# Patient Record
Sex: FEMALE | Race: WHITE | NOT HISPANIC OR LATINO | Employment: OTHER | URBAN - METROPOLITAN AREA
[De-identification: names, ages, dates, MRNs, and addresses within clinical notes are randomized per-mention and may not be internally consistent; named-entity substitution may affect disease eponyms.]

---

## 2022-10-19 ENCOUNTER — OFFICE VISIT (OUTPATIENT)
Dept: ORTHOPEDIC SURGERY | Facility: CLINIC | Age: 62
End: 2022-10-19

## 2022-10-19 ENCOUNTER — PREP FOR SURGERY (OUTPATIENT)
Dept: OTHER | Facility: HOSPITAL | Age: 62
End: 2022-10-19

## 2022-10-19 VITALS — TEMPERATURE: 97.4 F | WEIGHT: 125 LBS | BODY MASS INDEX: 25.2 KG/M2 | HEIGHT: 59 IN

## 2022-10-19 DIAGNOSIS — M17.12 ARTHRITIS OF LEFT KNEE: Primary | ICD-10-CM

## 2022-10-19 DIAGNOSIS — M17.12 ARTHRITIS OF LEFT KNEE: ICD-10-CM

## 2022-10-19 DIAGNOSIS — R52 PAIN: Primary | ICD-10-CM

## 2022-10-19 DIAGNOSIS — G89.29 OTHER CHRONIC PAIN: ICD-10-CM

## 2022-10-19 PROCEDURE — 99204 OFFICE O/P NEW MOD 45 MIN: CPT | Performed by: ORTHOPAEDIC SURGERY

## 2022-10-19 PROCEDURE — 73562 X-RAY EXAM OF KNEE 3: CPT | Performed by: ORTHOPAEDIC SURGERY

## 2022-10-19 RX ORDER — GABAPENTIN 400 MG/1
800 CAPSULE ORAL 4 TIMES DAILY
COMMUNITY

## 2022-10-19 RX ORDER — LIDOCAINE 50 MG/G
1 PATCH TOPICAL EVERY 24 HOURS
COMMUNITY

## 2022-10-19 RX ORDER — FOLIC ACID 1 MG/1
2 TABLET ORAL EVERY MORNING
COMMUNITY

## 2022-10-19 RX ORDER — PREGABALIN 75 MG/1
150 CAPSULE ORAL ONCE
Status: CANCELLED | OUTPATIENT
Start: 2023-01-10 | End: 2022-10-19

## 2022-10-19 RX ORDER — FEXOFENADINE HCL 180 MG/1
180 TABLET ORAL DAILY
COMMUNITY

## 2022-10-19 RX ORDER — ACETAMINOPHEN 500 MG
1000 TABLET ORAL ONCE
Status: CANCELLED | OUTPATIENT
Start: 2023-01-10 | End: 2022-10-19

## 2022-10-19 RX ORDER — METOPROLOL TARTRATE 50 MG/1
75 TABLET, FILM COATED ORAL 2 TIMES DAILY
COMMUNITY

## 2022-10-19 RX ORDER — ACYCLOVIR 200 MG/1
400 CAPSULE ORAL DAILY
COMMUNITY

## 2022-10-19 RX ORDER — MONTELUKAST SODIUM 10 MG/1
10 TABLET ORAL DAILY
COMMUNITY

## 2022-10-19 RX ORDER — DARIFENACIN HYDROBROMIDE 15 MG/1
15 TABLET, EXTENDED RELEASE ORAL DAILY
COMMUNITY

## 2022-10-19 RX ORDER — CHLORHEXIDINE GLUCONATE 500 MG/1
1 CLOTH TOPICAL TAKE AS DIRECTED
Status: CANCELLED | OUTPATIENT
Start: 2022-10-19

## 2022-10-19 RX ORDER — MORPHINE SULFATE 30 MG/1
30 TABLET ORAL EVERY 12 HOURS
COMMUNITY
End: 2023-01-11 | Stop reason: HOSPADM

## 2022-10-19 RX ORDER — NALOXONE HYDROCHLORIDE 4 MG/.1ML
SPRAY NASAL
COMMUNITY

## 2022-10-19 RX ORDER — PROMETHAZINE HYDROCHLORIDE 25 MG/1
25 TABLET ORAL EVERY 8 HOURS PRN
COMMUNITY
End: 2023-01-11 | Stop reason: HOSPADM

## 2022-10-19 RX ORDER — POVIDONE-IODINE 10 MG/ML
SOLUTION TOPICAL ONCE
Status: CANCELLED | OUTPATIENT
Start: 2023-01-10 | End: 2022-10-19

## 2022-10-19 RX ORDER — DOXEPIN HYDROCHLORIDE 75 MG/1
75 CAPSULE ORAL NIGHTLY
COMMUNITY

## 2022-10-19 RX ORDER — AMLODIPINE BESYLATE 10 MG/1
10 TABLET ORAL DAILY
COMMUNITY

## 2022-10-19 RX ORDER — UREA 10 %
2 LOTION (ML) TOPICAL DAILY
COMMUNITY

## 2022-10-19 RX ORDER — POLYETHYLENE GLYCOL 3350
POWDER (GRAM) MISCELLANEOUS AS NEEDED
COMMUNITY

## 2022-10-19 RX ORDER — PANTOPRAZOLE SODIUM 40 MG/1
40 GRANULE, DELAYED RELEASE ORAL 2 TIMES DAILY
COMMUNITY

## 2022-10-19 NOTE — PROGRESS NOTES
Patient Name: Es Hdez   YOB: 1960  Referring Primary Care Physician: Marianne Coronel, APRN  BMI: Body mass index is 25.25 kg/m².    Chief Complaint:    Chief Complaint   Patient presents with   • Left Knee - Follow-up        HPI:     Es Hdez is a 61 y.o. female who presents today for evaluation of   Chief Complaint   Patient presents with   • Left Knee - Follow-up   .  Patient is seen today complaining of left knee pain.  Says she has right knee pain as well.  [facility] Jonathan Sty[facility].  She says that in the last year they have tried to schedule her total knee 3 times and had to cancel because of COVID positivity in family and exposure and other events that came up.  Since he is getting ready to retire she is referred here today.  She has had injections in her knee she has had an arthroscopy on the left and 4 on the right been through physical therapy.  Had spine surgeries in her lumbar spine and more recently in her neck and is on chronic pain management with it.  She says that when she is had procedures done she is only gone up slightly on her pain meds and tried to regulate them back to her normal dosing as quickly as possible.  She denies any chronic medical problems and has had multiple orthopedic type injuries in the past per her chart.      Subjective   Medications:   Home Medications:  Current Outpatient Medications on File Prior to Visit   Medication Sig   • acyclovir (ZOVIRAX) 200 MG capsule TAKE TWO CAPSULES BY MOUTH TWICE A DAY (MAY TAKE 400 MG FIVE TIMES DAILY FOR 5 DAYS FOR COLD SORE OUTBREAK)   • amLODIPine (NORVASC) 10 MG tablet Take 1 tablet by mouth Daily.   • Calcium Polycarbophil 625 MG chewable tablet Chew.   • Cholecalciferol 50 MCG (2000 UT) capsule Take  by mouth.   • darifenacin (ENABLEX) 15 MG 24 hr tablet TAKE ONE TABLET BY MOUTH DAILY FOR BLADDER   • doxepin (SINEquan) 75 MG capsule TAKE 1 CAPSULE BY MOUTH AT BEDTIME -  THIS MEDICATION MAY MAKE YOU SLEEPY   • fexofenadine (ALLEGRA) 180 MG tablet Take 1 tablet by mouth Daily.   • folic acid (FOLVITE) 1 MG tablet Take 2 tablets by mouth Every Morning.   • gabapentin (NEURONTIN) 400 MG capsule Take 2 capsules by mouth 4 (Four) Times a Day.   • Lactobacillus tablet Take  by mouth.   • lidocaine (LIDODERM) 5 % Place 1 patch on the skin as directed by provider Daily.   • metoprolol tartrate (LOPRESSOR) 50 MG tablet Take 1.5 tablets by mouth.   • montelukast (SINGULAIR) 10 MG tablet Take 1 tablet by mouth Daily.   • Morphine (MSIR) 30 MG tablet Take 1 tablet by mouth Every 12 (Twelve) Hours.   • naloxone (NARCAN) 4 MG/0.1ML nasal spray USE 4 MG (ONE SPRAY) IN ONE NOSTRIL TIMES ONE DOSE - REPEAT WITH SECOND DEVICE INTO OTHER NOSTRIL AFTER 2-3 MINUTES IF NO OR MINIMAL RESPONSE.   • pantoprazole (PROTONIX) 40 MG pack packet Take 1 packet by mouth.   • Polyethylene Glycol 3350 powder Daily.   • promethazine (PHENERGAN) 25 MG tablet TAKE ONE-HALF TABLET BY MOUTH THREE TIMES A DAY AS NEEDED FOR NAUSEA     No current facility-administered medications on file prior to visit.     Current Medications:  Scheduled Meds:  Continuous Infusions:No current facility-administered medications for this visit.    PRN Meds:.    I have reviewed the patient's medical history in detail and updated the computerized patient record.  Review and summarization of old records includes:    Past Medical History:   Diagnosis Date   • Acromioclavicular separation 1986   • Arthritis of back 1987   • Arthritis of neck 1989   • Cervical disc disorder 1989   • CTS (carpal tunnel syndrome) ?   • Dislocated elbow 1985   • Dislocation of finger 1977   • Dislocation, shoulder 1986   • Fracture of wrist 1977   • Fracture, clavicle 1985   • Fracture, finger 1980   • Frozen shoulder 1989   • Knee sprain 1976   • Knee swelling 1987   • Low back strain 2000   • Lumbosacral disc disease 2000   • Neck strain 1989   • Periarthritis of  shoulder    • Rotator cuff syndrome    • Scoliosis    • Stress fracture    • Tear of meniscus of knee    • Tendinitis of knee ?   • Tennis elbow    • Thoracic disc disorder    • Wrist sprain 1090        Past Surgical History:   Procedure Laterality Date   • BACK SURGERY     • ELBOW PROCEDURE     • HAND SURGERY     • KNEE SURGERY     • NECK SURGERY     • SHOULDER SURGERY     • TRIGGER POINT INJECTION     • WRIST SURGERY          Social History     Occupational History   • Not on file   Tobacco Use   • Smoking status: Every Day     Packs/day: 0.50     Years: 48.00     Pack years: 24.00     Types: Cigarettes, Electronic Cigarette     Start date: 10/1/1972     Last attempt to quit: 10/14/2022     Years since quittin.0   • Smokeless tobacco: Former   Substance and Sexual Activity   • Alcohol use: Never   • Drug use: Never   • Sexual activity: Not Currently     Partners: Female     Birth control/protection: None, Hysterectomy      Social History     Social History Narrative   • Not on file        Family History   Problem Relation Age of Onset   • Rheumatologic disease Mother    • Scoliosis Mother    • Diabetes Paternal Grandmother    • Cancer Paternal Grandfather    • Broken bones Maternal Aunt    • Rheumatologic disease Maternal Aunt    • Dislocations Maternal Aunt    • Rheumatologic disease Sister    • Scoliosis Maternal Uncle    • Scoliosis Sister    • Severe sprains Brother        ROS: 14 point review of systems was performed and all other systems were reviewed and are negative except for documented findings in HPI and today's encounter.     Allergies:   Allergies   Allergen Reactions   • Iron Anaphylaxis     infusions   • Penicillins Anaphylaxis   • Sm Non-Asprin Sinus [Pseudoephedrine-Acetaminophen] Anaphylaxis   • Toradol [Ketorolac Tromethamine] Swelling     Constitutional:  Denies fever, shaking or chills   Eyes:  Denies change in visual acuity   HENT:  Denies nasal congestion or  "sore throat   Respiratory:  Denies cough or shortness of breath   Cardiovascular:  Denies chest pain or severe LE edema   GI:  Denies abdominal pain, nausea, vomiting, bloody stools or diarrhea   Musculoskeletal:  Numbness, tingling, pain, or loss of motor function only as noted above in history of present illness.  : Denies painful urination or hematuria  Integument:  Denies rash, lesion or ulceration   Neurologic:  Denies headache or focal weakness  Endocrine:  Denies lymphadenopathy  Psych:  Denies confusion or change in mental status   Hem:  Denies active bleeding    OBJECTIVE:  Physical Exam: 61 y.o. female  Wt Readings from Last 3 Encounters:   10/19/22 56.7 kg (125 lb)     Ht Readings from Last 1 Encounters:   10/19/22 149.9 cm (59\")     Body mass index is 25.25 kg/m².  Vitals:    10/19/22 1311   Temp: 97.4 °F (36.3 °C)     Vital signs reviewed.     General Appearance:    Alert, cooperative, in no acute distress                  Eyes: conjunctiva clear  ENT: external ears and nose atraumatic  CV: no peripheral edema  Resp: normal respiratory effort  Skin: no rashes or wounds; normal turgor  Psych: mood and affect appropriate  Lymph: no nodes appreciated  Neuro: gross sensation intact  Vascular:  Palpable peripheral pulse in noted extremity  Musculoskeletal Extremities: Exam very pleasant patient with swelling in her left greater than right knee.  She has some pseudolaxity and joint line tenderness with osteophytes crepitation to range of motion    Radiology:   AP lateral 40 degree PA x-ray left knee taken the office today for complaints of pain without comparison views views available show advanced end-stage arthritis with osteophytes medially in the patellofemoral joint and some subchondral sclerosis        Assessment:     ICD-10-CM ICD-9-CM   1. Pain  R52 780.96   2. Arthritis of left knee  M17.12 716.96   3. Other chronic pain  G89.29 338.29        MDM/Plan:   The diagnosis(es), natural history, " pathophysiology and treatment for diagnosis(es) were discussed. Opportunity given and questions answered.  Biomechanics of pertinent body areas discussed.  When appropriate, the use of ambulatory aids discussed.    MEDICAL RECORDS reviewed from other provider(s) for past and current medical history pertinent to this complaint.  Total Knee Replacement : Continuation of conservative management vs. TKA: I reviewed anatomy of a total knee arthroplasty in laymen's terms, as well as typical postoperative recovery and possibly 6-12 months for maximal recovery, and possible need for rehabilitation stay after hospitalization. We also discussed risks, benefits, alternatives, and limitations of procedure with risks including but not limited to neurovascular damage, bleeding, infection, malalignment, chronic pain, failure of implants, osteolysis, loosening of implants, loss of motion, weakness, stiffness, instability, DVT, pulmonary embolus, death, stroke, complex regional pain syndrome, myocardial infarction, and need for additional procedures. Concept of substitution vs. replacement discussed.  No guarantees were given regarding results of surgery.  Patient verbalized understanding, and was given the opportunity to ask and have all questions answered today.       10/19/2022    Dictated utilizing Dragon dictation

## 2022-10-20 ENCOUNTER — PATIENT ROUNDING (BHMG ONLY) (OUTPATIENT)
Dept: ORTHOPEDIC SURGERY | Facility: CLINIC | Age: 62
End: 2022-10-20

## 2022-12-27 ENCOUNTER — APPOINTMENT (OUTPATIENT)
Dept: PREADMISSION TESTING | Facility: HOSPITAL | Age: 62
End: 2022-12-27

## 2022-12-28 ENCOUNTER — PRE-ADMISSION TESTING (OUTPATIENT)
Dept: PREADMISSION TESTING | Facility: HOSPITAL | Age: 62
End: 2022-12-28

## 2022-12-28 VITALS
HEART RATE: 85 BPM | HEIGHT: 59 IN | SYSTOLIC BLOOD PRESSURE: 147 MMHG | RESPIRATION RATE: 18 BRPM | BODY MASS INDEX: 22.58 KG/M2 | OXYGEN SATURATION: 97 % | DIASTOLIC BLOOD PRESSURE: 63 MMHG | WEIGHT: 112 LBS | TEMPERATURE: 98.2 F

## 2022-12-28 DIAGNOSIS — M17.12 ARTHRITIS OF LEFT KNEE: ICD-10-CM

## 2022-12-28 LAB
ANION GAP SERPL CALCULATED.3IONS-SCNC: 9 MMOL/L (ref 5–15)
BUN SERPL-MCNC: 14 MG/DL (ref 8–23)
BUN/CREAT SERPL: 19.4 (ref 7–25)
CALCIUM SPEC-SCNC: 9.1 MG/DL (ref 8.6–10.5)
CHLORIDE SERPL-SCNC: 100 MMOL/L (ref 98–107)
CO2 SERPL-SCNC: 27 MMOL/L (ref 22–29)
CREAT SERPL-MCNC: 0.72 MG/DL (ref 0.57–1)
DEPRECATED RDW RBC AUTO: 41.2 FL (ref 37–54)
EGFRCR SERPLBLD CKD-EPI 2021: 95.9 ML/MIN/1.73
ERYTHROCYTE [DISTWIDTH] IN BLOOD BY AUTOMATED COUNT: 12.6 % (ref 12.3–15.4)
GLUCOSE SERPL-MCNC: 95 MG/DL (ref 65–99)
HBA1C MFR BLD: 5.8 % (ref 4.8–5.6)
HCT VFR BLD AUTO: 35 % (ref 34–46.6)
HGB BLD-MCNC: 12 G/DL (ref 12–15.9)
MCH RBC QN AUTO: 30.8 PG (ref 26.6–33)
MCHC RBC AUTO-ENTMCNC: 34.3 G/DL (ref 31.5–35.7)
MCV RBC AUTO: 90 FL (ref 79–97)
PLATELET # BLD AUTO: 279 10*3/MM3 (ref 140–450)
PMV BLD AUTO: 9.7 FL (ref 6–12)
POTASSIUM SERPL-SCNC: 4 MMOL/L (ref 3.5–5.2)
RBC # BLD AUTO: 3.89 10*6/MM3 (ref 3.77–5.28)
SODIUM SERPL-SCNC: 136 MMOL/L (ref 136–145)
WBC NRBC COR # BLD: 8.68 10*3/MM3 (ref 3.4–10.8)

## 2022-12-28 PROCEDURE — 93005 ELECTROCARDIOGRAM TRACING: CPT

## 2022-12-28 PROCEDURE — 85027 COMPLETE CBC AUTOMATED: CPT

## 2022-12-28 PROCEDURE — 93010 ELECTROCARDIOGRAM REPORT: CPT | Performed by: INTERNAL MEDICINE

## 2022-12-28 PROCEDURE — 83036 HEMOGLOBIN GLYCOSYLATED A1C: CPT

## 2022-12-28 PROCEDURE — 80048 BASIC METABOLIC PNL TOTAL CA: CPT

## 2022-12-28 PROCEDURE — 36415 COLL VENOUS BLD VENIPUNCTURE: CPT

## 2022-12-28 RX ORDER — CHLORHEXIDINE GLUCONATE 500 MG/1
CLOTH TOPICAL TAKE AS DIRECTED
Status: ON HOLD | COMMUNITY
End: 2023-01-10

## 2022-12-28 RX ORDER — HYDROCODONE BITARTRATE AND ACETAMINOPHEN 10; 325 MG/1; MG/1
1 TABLET ORAL EVERY 8 HOURS PRN
COMMUNITY
End: 2023-01-11 | Stop reason: HOSPADM

## 2022-12-28 NOTE — DISCHARGE INSTRUCTIONS
Take the following medications the morning of surgery: METOPROLOL, AMLODIPINE, GABAPENTIN, AND PANTOPRAZOLE    ARRIVE AT 9 AM ON 1/10/23    If you are on prescription narcotic pain medication to control your pain you may also take that medication the morning of surgery.    General Instructions:  Do not eat solid food after midnight the night before surgery.  You may drink clear liquids day of surgery but must stop at least one hour before your hospital arrival time.  It is beneficial for you to have a clear drink that contains carbohydrates the day of surgery.  We suggest a 12 to 20 ounce bottle of Gatorade or Powerade for non-diabetic patients or a 12 to 20 ounce bottle of G2 or Powerade Zero for diabetic patients. (Pediatric patients, are not advised to drink a 12 to 20 ounce carbohydrate drink)    Clear liquids are liquids you can see through.  Nothing red in color.     Plain water                               Sports drinks  Sodas                                   Gelatin (Jell-O)  Fruit juices without pulp such as white grape juice and apple juice  Popsicles that contain no fruit or yogurt  Tea or coffee (no cream or milk added)  Gatorade / Powerade  G2 / Powerade Zero    Infants may have breast milk up to four hours before surgery.  Infants drinking formula may drink formula up to six hours before surgery.   Patients who avoid smoking, chewing tobacco and alcohol for 4 weeks prior to surgery have a reduced risk of post-operative complications.  Quit smoking as many days before surgery as you can.  Do not smoke, use chewing tobacco or drink alcohol the day of surgery.   If applicable bring your C-PAP/ BI-PAP machine.  Bring any papers given to you in the doctor’s office.  Wear clean comfortable clothes.  Do not wear contact lenses, false eyelashes or make-up.  Bring a case for your glasses.   Bring crutches or walker if applicable.  Remove all piercings.  Leave jewelry and any other valuables at home.  Hair  extensions with metal clips must be removed prior to surgery.  The Pre-Admission Testing nurse will instruct you to bring medications if unable to obtain an accurate list in Pre-Admission Testing.        If you were given a blood bank ID arm band remember to bring it with you the day of surgery.    Preventing a Surgical Site Infection:  For 2 to 3 days before surgery, avoid shaving with a razor because the razor can irritate skin and make it easier to develop an infection.    Any areas of open skin can increase the risk of a post-operative wound infection by allowing bacteria to enter and travel throughout the body.  Notify your surgeon if you have any skin wounds / rashes even if it is not near the expected surgical site.  The area will need assessed to determine if surgery should be delayed until it is healed.  The night prior to surgery shower using a fresh bar of anti-bacterial soap (such as Dial) and clean washcloth.  Sleep in a clean bed with clean clothing.  Do not allow pets to sleep with you.  Shower on the morning of surgery using a fresh bar of anti-bacterial soap (such as Dial) and clean washcloth.  Dry with a clean towel and dress in clean clothing.  Ask your surgeon if you will be receiving antibiotics prior to surgery.  Make sure you, your family, and all healthcare providers clean their hands with soap and water or an alcohol based hand  before caring for you or your wound.    Day of surgery:  Your arrival time is approximately two hours before your scheduled surgery time.  Upon arrival, a Pre-op nurse and Anesthesiologist will review your health history, obtain vital signs, and answer questions you may have.  The only belongings needed at this time will be a list of your home medications and if applicable your C-PAP/BI-PAP machine.  A Pre-op nurse will start an IV and you may receive medication in preparation for surgery, including something to help you relax.     Please be aware that  surgery does come with discomfort.  We want to make every effort to control your discomfort so please discuss any uncontrolled symptoms with your nurse.   Your doctor will most likely have prescribed pain medications.      If you are going home after surgery you will receive individualized written care instructions before being discharged.  A responsible adult must drive you to and from the hospital on the day of your surgery and stay with you for 24 hours.  Discharge prescriptions can be filled by the hospital pharmacy during regular pharmacy hours.  If you are having surgery late in the day/evening your prescription may be e-prescribed to your pharmacy.  Please verify your pharmacy hours or chose a 24 hour pharmacy to avoid not having access to your prescription because your pharmacy has closed for the day.    If you are staying overnight following surgery, you will be transported to your hospital room following the recovery period.  [facility] has all private rooms.    If you have any questions please call Pre-Admission Testing at [phone].  Deductibles and co-payments are collected on the day of service. Please be prepared to pay the required co-pay, deductible or deposit on the day of service as defined by your plan.    Call your surgeon immediately if you experience any of the following symptoms:  Sore Throat  Shortness of Breath or difficulty breathing  Cough  Chills  Body soreness or muscle pain  Headache  Fever  New loss of taste or smell  Do not arrive for your surgery ill.  Your procedure will need to be rescheduled to another time.  You will need to call your physician before the day of surgery to avoid any unnecessary exposure to hospital staff as well as other patients.   CHLORHEXIDINE CLOTH INSTRUCTIONS  The morning of surgery follow these instructions using the Chlorhexidine cloths you've been given.  These steps reduce bacteria on the body.  Do not use the cloths near your eyes,  ears mouth, genitalia or on open wounds.  Throw the cloths away after use but do not try to flush them down a toilet.      Open and remove one cloth at a time from the package.    Leave the cloth unfolded and begin the bathing.  Massage the skin with the cloths using gentle pressure to remove bacteria.  Do not scrub harshly.   Follow the steps below with one 2% CHG cloth per area (6 total cloths).  One cloth for neck, shoulders and chest.  One cloth for both arms, hands, fingers and underarms (do underarms last).  One cloth for the abdomen followed by groin.  One cloth for right leg and foot including between the toes.  One cloth for left leg and foot including between the toes.  The last cloth is to be used for the back of the neck, back and buttocks.    Allow the CHG to air dry 3 minutes on the skin which will give it time to work and decrease the chance of irritation.  The skin may feel sticky until it is dry.  Do not rinse with water or any other liquid or you will lose the beneficial effects of the CHG.  If mild skin irritation occurs, do rinse the skin to remove the CHG.  Report this to the nurse at time of admission.  Do not apply lotions, creams, ointments, deodorants or perfumes after using the clothes. Dress in clean clothes before coming to the hospital.

## 2022-12-29 LAB — QT INTERVAL: 409 MS

## 2023-01-03 ENCOUNTER — TELEPHONE (OUTPATIENT)
Dept: ORTHOPEDIC SURGERY | Facility: CLINIC | Age: 63
End: 2023-01-03

## 2023-01-03 NOTE — TELEPHONE ENCOUNTER
Caller: Es Hdez    Relationship to patient: Self    Best call back number:[phone]    Chief complaint: LEFT KNEE    Type of visit: PRE OP    If rescheduling, when is the original appointment: 01/03/23    Additional notes: PT CURRENTLY EXPERIENCING COVID SYMPTOMS AFTER HAVING PHYSICAL CONTACT WITH ANOTHER INFECTED PERSON. PT WOULD LIKE TO R/S 01/03 APPT FOR HER SX 01/10/23. PLEASE ADVISE

## 2023-01-04 ENCOUNTER — TELEPHONE (OUTPATIENT)
Dept: ORTHOPEDIC SURGERY | Facility: HOSPITAL | Age: 63
End: 2023-01-04
Payer: OTHER GOVERNMENT

## 2023-01-04 NOTE — TELEPHONE ENCOUNTER
Called and spoke with Ms. Hdez to see if she would be interested in going home Tuesday after her procedure. She said she would be interested in going, but the person that is going to be staying with her has made plans for Tuesday evening and wasn't going to be available until Wednesday afternoon to stay with her. Ms. Hdez is going to be an overnight admit at this time.

## 2023-01-09 ENCOUNTER — OFFICE VISIT (OUTPATIENT)
Dept: ORTHOPEDIC SURGERY | Facility: CLINIC | Age: 63
End: 2023-01-09
Payer: OTHER GOVERNMENT

## 2023-01-09 VITALS — HEIGHT: 59 IN | TEMPERATURE: 98.6 F | BODY MASS INDEX: 22.58 KG/M2 | WEIGHT: 112 LBS

## 2023-01-09 DIAGNOSIS — M17.12 ARTHRITIS OF KNEE, LEFT: Primary | ICD-10-CM

## 2023-01-09 PROCEDURE — 99024 POSTOP FOLLOW-UP VISIT: CPT | Performed by: NURSE PRACTITIONER

## 2023-01-09 NOTE — H&P (VIEW-ONLY)
"   History & Physical       Patient: Es Hdez  YOB: 1960  Medical Record Number: [id number]  Wt Readings from Last 3 Encounters:   12/28/22 50.8 kg (112 lb)   10/19/22 56.7 kg (125 lb)     Ht Readings from Last 3 Encounters:   12/28/22 149.9 cm (59\")   10/19/22 149.9 cm (59\")     There is no height or weight on file to calculate BMI.  No height and weight on file for this encounter.    Surgeon:  Dr. Nicanor Benavides    Chief Complaints:   Chief Complaint   Patient presents with   • Left Knee - Follow-up     Surgery: Left Total Knee Arthroplasty with [place] Navigation    Subjective:    History of Present Illness: 62 y.o. female presents with   Chief Complaint   Patient presents with   • Left Knee - Follow-up   . Chronic symptoms have been progressively worsening despite more conservative treatment measures including medications OTC and prescription, cortisone injections and physical therapy.  Symptoms are associated with ability to move, exercise, and perform activities of daily living.  Symptoms are aggravated by weight bearing and ROM necessary for activities of daily living.   Symptoms improve with rest, ice and elevation only minimally.      Allergies:   Allergies   Allergen Reactions   • Iron Anaphylaxis     infusions   • Penicillins Anaphylaxis   • Sm Non-Asprin Sinus [Pseudoephedrine-Acetaminophen] Anaphylaxis   • Nsaids Other (See Comments)     HISTORY OF GI BLEED   • Toradol [Ketorolac Tromethamine] Swelling       Medications:   Home Medications:  Current Outpatient Medications on File Prior to Visit   Medication Sig   • acyclovir (ZOVIRAX) 200 MG capsule Take 400 mg by mouth Daily.   • amLODIPine (NORVASC) 10 MG tablet Take 1 tablet by mouth Daily.   • Calcium Polycarbophil 625 MG chewable tablet Chew 1 tablet Daily.   • Chlorhexidine Gluconate Cloth 2 % pads Apply  topically Take As Directed.   • Cholecalciferol 50 MCG (2000 UT) capsule Take 2,000 Units by mouth Daily.   • darifenacin " (ENABLEX) 15 MG 24 hr tablet Take 15 mg by mouth Daily.   • doxepin (SINEquan) 75 MG capsule Take 75 mg by mouth Every Night.   • fexofenadine (ALLEGRA) 180 MG tablet Take 1 tablet by mouth Daily.   • folic acid (FOLVITE) 1 MG tablet Take 2 tablets by mouth Every Morning.   • gabapentin (NEURONTIN) 400 MG capsule Take 2 capsules by mouth 4 (Four) Times a Day.   • HYDROcodone-acetaminophen (NORCO)  MG per tablet Take 1 tablet by mouth Every 8 (Eight) Hours As Needed for Moderate Pain.   • Lactobacillus tablet Take 2 tablets by mouth Daily.   • lidocaine (LIDODERM) 5 % Place 1 patch on the skin as directed by provider Daily.   • metoprolol tartrate (LOPRESSOR) 50 MG tablet Take 75 mg by mouth 2 (Two) Times a Day.   • montelukast (SINGULAIR) 10 MG tablet Take 1 tablet by mouth Daily.   • Morphine (MSIR) 30 MG tablet Take 1 tablet by mouth Every 12 (Twelve) Hours.   • naloxone (NARCAN) 4 MG/0.1ML nasal spray USE 4 MG (ONE SPRAY) IN ONE NOSTRIL TIMES ONE DOSE - REPEAT WITH SECOND DEVICE INTO OTHER NOSTRIL AFTER 2-3 MINUTES IF NO OR MINIMAL RESPONSE.   • pantoprazole (PROTONIX) 40 MG pack packet Take 40 mg by mouth 2 (Two) Times a Day.   • Polyethylene Glycol 3350 powder As Needed.   • promethazine (PHENERGAN) 25 MG tablet Take 25 mg by mouth Every 8 (Eight) Hours As Needed.     Current Facility-Administered Medications on File Prior to Visit   Medication   • [START ON 1/10/2023] ropivacaine 0.5 % 50 mL, cloNIDine 80 mcg, EPINEPHrine 1 mg/mL 0.3 mg in sodium chloride 0.9 % 50 mL solution     Current Medications:  Scheduled Meds:  Continuous Infusions:No current facility-administered medications for this visit.    PRN Meds:.    I have reviewed the patient's medical history in detail and updated the computerized patient record.  Review and summarization of old records include:    Past Medical History:   Diagnosis Date   • Acromioclavicular separation 1986   • Arthritis of back 1987   • Arthritis of neck 1989   •  Bladder spasms    • Cervical disc disorder    • CTS (carpal tunnel syndrome) ?   • Dislocated elbow    • Dislocation of finger    • Dislocation, shoulder    • Fracture of wrist    • Fracture, clavicle    • Fracture, finger    • Frozen shoulder    • GERD (gastroesophageal reflux disease)    • History of GI bleed    • Knee sprain    • Knee swelling    • Low back strain    • Lumbosacral disc disease    • Neck strain    • Partial deafness of right ear    • Periarthritis of shoulder    • Rotator cuff syndrome    • Scoliosis    • Slow gastric motility    • Stress fracture    • Tear of meniscus of knee    • Tendinitis of knee ?   • Tennis elbow    • Thoracic disc disorder    • Wrist sprain 1090        Past Surgical History:   Procedure Laterality Date   • BACK SURGERY     • ELBOW PROCEDURE     • HAND SURGERY     • HYSTERECTOMY     • KNEE SURGERY     • NECK SURGERY     • SHOULDER SURGERY     • TRIGGER POINT INJECTION     • WRIST SURGERY          Social History     Occupational History   • Not on file   Tobacco Use   • Smoking status: Every Day     Packs/day: 0.50     Years: 48.00     Pack years: 24.00     Types: Cigarettes, Electronic Cigarette     Start date: 10/1/1972     Last attempt to quit: 10/14/2022     Years since quittin.2   • Smokeless tobacco: Former   Substance and Sexual Activity   • Alcohol use: Never   • Drug use: Never   • Sexual activity: Not Currently     Partners: Female     Birth control/protection: None, Hysterectomy      Social History     Social History Narrative   • Not on file        Family History   Problem Relation Age of Onset   • Rheumatologic disease Mother    • Scoliosis Mother    • Rheumatologic disease Sister    • Scoliosis Sister    • Severe sprains Brother    • Broken bones Maternal Aunt    • Rheumatologic disease Maternal Aunt    • Dislocations Maternal Aunt    • Scoliosis Maternal Uncle    • Diabetes  "Paternal Grandmother    • Cancer Paternal Grandfather    • Malig Hyperthermia Neg Hx        ROS: 14 point review of systems was performed and was negative except for documented findings in HPI and today's encounter.       Constitutional:  Denies fever, shaking or chills   Eyes:  Denies change in visual acuity   HENT:  Denies nasal congestion or sore throat   Respiratory:  Denies cough or shortness of breath   Cardiovascular:  Denies chest pain or severe LE edema   GI:  Denies abdominal pain, nausea, vomiting, bloody stools or diarrhea   Musculoskeletal:  Denies numbness tingling or loss of motor function except as outlined above in history of present illness.  : Denies painful urination or hematuria  Integument:  Denies rash, lesion or ulceration   Neurologic:  Denies headache or focal weakness  Endocrine:  Denies lymphadenopathy  Psych:  Denies confusion or change in mental status   Hem:  Denies active bleeding    Physical Exam: 62 y.o. female  Wt Readings from Last 3 Encounters:   12/28/22 50.8 kg (112 lb)   10/19/22 56.7 kg (125 lb)     Ht Readings from Last 3 Encounters:   12/28/22 149.9 cm (59\")   10/19/22 149.9 cm (59\")     There is no height or weight on file to calculate BMI.  No height and weight on file for this encounter.  There were no vitals filed for this visit.    Vital signs reviewed.   General Appearance:    Alert, cooperative, in no acute distress                  Eyes: conjunctiva clear  ENT: external ears and nose atraumatic  CV: no peripheral edema  Resp: normal respiratory effort  Skin: no rashes or wounds; normal turgor  Psych: mood and affect appropriate  Lymph: no nodes appreciated  Neuro: gross sensation intact  Vascular:  Palpable peripheral pulse in noted extremity  Musculoskeletal Extremities: KNEE Exam: medial joint line tenderness with crepitation, synovitis, swelling, and joint effusion left knee.        Diagnostic Tests:  Lab Results   Component Value Date    WBC 8.68 12/28/2022    " HGB 12.0 2022    HCT 35.0 2022    MCV 90.0 2022     2022       Lab Results   Component Value Date    GLUCOSE 95 2022    CALCIUM 9.1 2022     2022    K 4.0 2022    CO2 27.0 2022     2022    BUN 14 2022    CREATININE 0.72 2022    BCR 19.4 2022    ANIONGAP 9.0 2022       EKG:     Progress Note    HEART RATE= 78  bpm  RR Interval= 769  ms  DC Interval= 162  ms  P Horizontal Axis= 31  deg  P Front Axis= 78  deg  QRSD Interval= 139  ms  QT Interval= 409  ms  QRS Axis= 54  deg  T Wave Axis= 67  deg  - ABNORMAL ECG -  Sinus rhythm  IVCD, consider atypical LBBB  No Prior Tracing for Comparison  Electronically Signed By: Talib YadavRITO) 29-Dec-2022 09:04:44  Date and Time of Study: 2022 12:06:48     Previous EKG 2022 [facility]  Performed by [facility]  CARDIOLOGY REPORT   FACILITY: [facility]     PATIENT NAME/: ALEX ECHEVARRIA    1960   UNIT/AGE/GENDER:      AGE: 61 YR        GENDER: F   UNIT NUMBER: [id number]   ACCOUNT NUMBER: [id number]   ACCESSION NUMBER: [id number]     DATE OF EXAM: 2022  14:26   EXAMINATION(S): ECG 12-LEAD     FINAL REPORT     Procedure: ELECTROCARDIOGRAM RESULT   Heart Rate     78   P-R Interval     150 ms   QRS Interval     138 ms   QT Interval     414 ms   QTC Interval     472 ms   P Axis     67 deg   QRS Axis     -35 deg   T Wave Axis     90 deg   DATE: 2022 14:26   Sinus rhythm   Probable left atrial enlargement   Left bundle branch block   Summary: Abnormal ECG   Compared with: 2020 9:14 AM   NO SIGNIFICANT CHANGE   Electronically signed by Evert García  2022 15:08    - Pateint has history of LBBB, [facility] in  [facility] Suk[facility]'s [facility]      I have reviewed all the lab & EKG results.     Imaging was done previously in the office, viewed images and discussed with the patient:    Indication: pain related symptoms,  Assessment:  Patient Active  Problem List   Diagnosis   • Arthritis of left knee       Plan:  Reviewed anatomy of a total joint arthroplasty in laymen's terms, as well as typical postoperative recovery and possibly 6-12 months for maximal recovery, and possible need for rehabilitation stay after hospitalization. We also discussed risks, benefits, alternatives, and limitations of procedure with risks including but not limited to neurovascular damage, bleeding, infection, malalignment, chronic pian, failure of implants, osteolysis, loosening of implants, loss of motion, weakness, stiffness, instability, DVT, pulmonary embolus, death, stroke, complex regional pain syndrome, myocardial infarction, and need for additional procedures. Concept of substitution vs. replacement discussed.  No guarantees were given regarding results of surgery.      Es Hdez was given the opportunity to ask and have all questions answered today.  The patient voiced understanding of the risks, benefits, and alternative forms of treatment that were discussed and the patient consents to proceed with surgery.       Skin breakdown? WNL  Metal allergy? No  COVID Status:Vaccinated no Booster and History of Positive COVID test - symptomatic  DVT Risk Factors: no significant increased risk factors    DVT Prophylaxis:  Eliquis 2.5mg BID x 4 weeks (ASA allergy)  Walker: needs one ordered  Consults: none  Additional orders: Will need higher pain medication due to chronic pain mangemant - pain management is aware of surgery  Pharmacy: meds to beds    - Patient had positive COVID test 12/31/2022 with fever x 3 days, quarantined for 10 days, today being day 10. She has not had any fever or symptoms in the last 7 days.  She is okay to proceed with surgery at this time as she is outside of her 10-day quarantine, and has remained symptom-free.    Discharge Plan: POD 1 to home, home health and when cleared by physical therapy as safe for discharge    To be updated    Date:  1/9/2023  KLEBER Honeycutt    Dictated Utilizing Dragon Dictation

## 2023-01-09 NOTE — PROGRESS NOTES
History & Physical       Patient: Es Hdez  YOB: 1960  Medical Record Number: [id number]  Wt Readings from Last 3 Encounters:   12/28/22 50.8 kg (112 lb)   10/19/22 56.7 kg (125 lb)     Ht Readings from Last 3 Encounters:   12/28/22 149.9 cm (59\")   10/19/22 149.9 cm (59\")     There is no height or weight on file to calculate BMI.  No height and weight on file for this encounter.    Surgeon:  Dr. Nicanor Benavides    Chief Complaints:   Chief Complaint   Patient presents with   • Left Knee - Follow-up     Surgery: Left Total Knee Arthroplasty with [place] Navigation    Subjective:    History of Present Illness: 62 y.o. female presents with   Chief Complaint   Patient presents with   • Left Knee - Follow-up   . Chronic symptoms have been progressively worsening despite more conservative treatment measures including medications OTC and prescription, cortisone injections and physical therapy.  Symptoms are associated with ability to move, exercise, and perform activities of daily living.  Symptoms are aggravated by weight bearing and ROM necessary for activities of daily living.   Symptoms improve with rest, ice and elevation only minimally.      Allergies:   Allergies   Allergen Reactions   • Iron Anaphylaxis     infusions   • Penicillins Anaphylaxis   • Sm Non-Asprin Sinus [Pseudoephedrine-Acetaminophen] Anaphylaxis   • Nsaids Other (See Comments)     HISTORY OF GI BLEED   • Toradol [Ketorolac Tromethamine] Swelling       Medications:   Home Medications:  Current Outpatient Medications on File Prior to Visit   Medication Sig   • acyclovir (ZOVIRAX) 200 MG capsule Take 400 mg by mouth Daily.   • amLODIPine (NORVASC) 10 MG tablet Take 1 tablet by mouth Daily.   • Calcium Polycarbophil 625 MG chewable tablet Chew 1 tablet Daily.   • Chlorhexidine Gluconate Cloth 2 % pads Apply  topically Take As Directed.   • Cholecalciferol 50 MCG (2000 UT) capsule Take 2,000 Units by mouth Daily.   • darifenacin  (ENABLEX) 15 MG 24 hr tablet Take 15 mg by mouth Daily.   • doxepin (SINEquan) 75 MG capsule Take 75 mg by mouth Every Night.   • fexofenadine (ALLEGRA) 180 MG tablet Take 1 tablet by mouth Daily.   • folic acid (FOLVITE) 1 MG tablet Take 2 tablets by mouth Every Morning.   • gabapentin (NEURONTIN) 400 MG capsule Take 2 capsules by mouth 4 (Four) Times a Day.   • HYDROcodone-acetaminophen (NORCO)  MG per tablet Take 1 tablet by mouth Every 8 (Eight) Hours As Needed for Moderate Pain.   • Lactobacillus tablet Take 2 tablets by mouth Daily.   • lidocaine (LIDODERM) 5 % Place 1 patch on the skin as directed by provider Daily.   • metoprolol tartrate (LOPRESSOR) 50 MG tablet Take 75 mg by mouth 2 (Two) Times a Day.   • montelukast (SINGULAIR) 10 MG tablet Take 1 tablet by mouth Daily.   • Morphine (MSIR) 30 MG tablet Take 1 tablet by mouth Every 12 (Twelve) Hours.   • naloxone (NARCAN) 4 MG/0.1ML nasal spray USE 4 MG (ONE SPRAY) IN ONE NOSTRIL TIMES ONE DOSE - REPEAT WITH SECOND DEVICE INTO OTHER NOSTRIL AFTER 2-3 MINUTES IF NO OR MINIMAL RESPONSE.   • pantoprazole (PROTONIX) 40 MG pack packet Take 40 mg by mouth 2 (Two) Times a Day.   • Polyethylene Glycol 3350 powder As Needed.   • promethazine (PHENERGAN) 25 MG tablet Take 25 mg by mouth Every 8 (Eight) Hours As Needed.     Current Facility-Administered Medications on File Prior to Visit   Medication   • [START ON 1/10/2023] ropivacaine 0.5 % 50 mL, cloNIDine 80 mcg, EPINEPHrine 1 mg/mL 0.3 mg in sodium chloride 0.9 % 50 mL solution     Current Medications:  Scheduled Meds:  Continuous Infusions:No current facility-administered medications for this visit.    PRN Meds:.    I have reviewed the patient's medical history in detail and updated the computerized patient record.  Review and summarization of old records include:    Past Medical History:   Diagnosis Date   • Acromioclavicular separation 1986   • Arthritis of back 1987   • Arthritis of neck 1989   •  Bladder spasms    • Cervical disc disorder    • CTS (carpal tunnel syndrome) ?   • Dislocated elbow    • Dislocation of finger    • Dislocation, shoulder    • Fracture of wrist    • Fracture, clavicle    • Fracture, finger    • Frozen shoulder    • GERD (gastroesophageal reflux disease)    • History of GI bleed    • Knee sprain    • Knee swelling    • Low back strain    • Lumbosacral disc disease    • Neck strain    • Partial deafness of right ear    • Periarthritis of shoulder    • Rotator cuff syndrome    • Scoliosis    • Slow gastric motility    • Stress fracture    • Tear of meniscus of knee    • Tendinitis of knee ?   • Tennis elbow    • Thoracic disc disorder    • Wrist sprain 1090        Past Surgical History:   Procedure Laterality Date   • BACK SURGERY     • ELBOW PROCEDURE     • HAND SURGERY     • HYSTERECTOMY     • KNEE SURGERY     • NECK SURGERY     • SHOULDER SURGERY     • TRIGGER POINT INJECTION     • WRIST SURGERY          Social History     Occupational History   • Not on file   Tobacco Use   • Smoking status: Every Day     Packs/day: 0.50     Years: 48.00     Pack years: 24.00     Types: Cigarettes, Electronic Cigarette     Start date: 10/1/1972     Last attempt to quit: 10/14/2022     Years since quittin.2   • Smokeless tobacco: Former   Substance and Sexual Activity   • Alcohol use: Never   • Drug use: Never   • Sexual activity: Not Currently     Partners: Female     Birth control/protection: None, Hysterectomy      Social History     Social History Narrative   • Not on file        Family History   Problem Relation Age of Onset   • Rheumatologic disease Mother    • Scoliosis Mother    • Rheumatologic disease Sister    • Scoliosis Sister    • Severe sprains Brother    • Broken bones Maternal Aunt    • Rheumatologic disease Maternal Aunt    • Dislocations Maternal Aunt    • Scoliosis Maternal Uncle    • Diabetes  Paternal Grandmother    • Cancer Paternal Grandfather    • Malig Hyperthermia Neg Hx        ROS: 14 point review of systems was performed and was negative except for documented findings in HPI and today's encounter.       Constitutional:  Denies fever, shaking or chills   Eyes:  Denies change in visual acuity   HENT:  Denies nasal congestion or sore throat   Respiratory:  Denies cough or shortness of breath   Cardiovascular:  Denies chest pain or severe LE edema   GI:  Denies abdominal pain, nausea, vomiting, bloody stools or diarrhea   Musculoskeletal:  Denies numbness tingling or loss of motor function except as outlined above in history of present illness.  : Denies painful urination or hematuria  Integument:  Denies rash, lesion or ulceration   Neurologic:  Denies headache or focal weakness  Endocrine:  Denies lymphadenopathy  Psych:  Denies confusion or change in mental status   Hem:  Denies active bleeding    Physical Exam: 62 y.o. female  Wt Readings from Last 3 Encounters:   12/28/22 50.8 kg (112 lb)   10/19/22 56.7 kg (125 lb)     Ht Readings from Last 3 Encounters:   12/28/22 149.9 cm (59\")   10/19/22 149.9 cm (59\")     There is no height or weight on file to calculate BMI.  No height and weight on file for this encounter.  There were no vitals filed for this visit.    Vital signs reviewed.   General Appearance:    Alert, cooperative, in no acute distress                  Eyes: conjunctiva clear  ENT: external ears and nose atraumatic  CV: no peripheral edema  Resp: normal respiratory effort  Skin: no rashes or wounds; normal turgor  Psych: mood and affect appropriate  Lymph: no nodes appreciated  Neuro: gross sensation intact  Vascular:  Palpable peripheral pulse in noted extremity  Musculoskeletal Extremities: KNEE Exam: medial joint line tenderness with crepitation, synovitis, swelling, and joint effusion left knee.        Diagnostic Tests:  Lab Results   Component Value Date    WBC 8.68 12/28/2022     HGB 12.0 2022    HCT 35.0 2022    MCV 90.0 2022     2022       Lab Results   Component Value Date    GLUCOSE 95 2022    CALCIUM 9.1 2022     2022    K 4.0 2022    CO2 27.0 2022     2022    BUN 14 2022    CREATININE 0.72 2022    BCR 19.4 2022    ANIONGAP 9.0 2022       EKG:     Progress Note    HEART RATE= 78  bpm  RR Interval= 769  ms  DC Interval= 162  ms  P Horizontal Axis= 31  deg  P Front Axis= 78  deg  QRSD Interval= 139  ms  QT Interval= 409  ms  QRS Axis= 54  deg  T Wave Axis= 67  deg  - ABNORMAL ECG -  Sinus rhythm  IVCD, consider atypical LBBB  No Prior Tracing for Comparison  Electronically Signed By: Talib YadavRITO) 29-Dec-2022 09:04:44  Date and Time of Study: 2022 12:06:48     Previous EKG 2022 [facility]  Performed by [facility]  CARDIOLOGY REPORT   FACILITY: [facility]     PATIENT NAME/: ALEX ECHEVARRIA    1960   UNIT/AGE/GENDER:      AGE: 61 YR        GENDER: F   UNIT NUMBER: [id number]   ACCOUNT NUMBER: [id number]   ACCESSION NUMBER: [id number]     DATE OF EXAM: 2022  14:26   EXAMINATION(S): ECG 12-LEAD     FINAL REPORT     Procedure: ELECTROCARDIOGRAM RESULT   Heart Rate     78   P-R Interval     150 ms   QRS Interval     138 ms   QT Interval     414 ms   QTC Interval     472 ms   P Axis     67 deg   QRS Axis     -35 deg   T Wave Axis     90 deg   DATE: 2022 14:26   Sinus rhythm   Probable left atrial enlargement   Left bundle branch block   Summary: Abnormal ECG   Compared with: 2020 9:14 AM   NO SIGNIFICANT CHANGE   Electronically signed by Evert García  2022 15:08    - Pateint has history of LBBB, [facility] Suk[facility]'s [facility]      I have reviewed all the lab & EKG results.     Imaging was done previously in the office, viewed images and discussed with the patient:    Indication: pain related symptoms,  Assessment:  Patient Active  Problem List   Diagnosis   • Arthritis of left knee       Plan:  Reviewed anatomy of a total joint arthroplasty in laymen's terms, as well as typical postoperative recovery and possibly 6-12 months for maximal recovery, and possible need for rehabilitation stay after hospitalization. We also discussed risks, benefits, alternatives, and limitations of procedure with risks including but not limited to neurovascular damage, bleeding, infection, malalignment, chronic pian, failure of implants, osteolysis, loosening of implants, loss of motion, weakness, stiffness, instability, DVT, pulmonary embolus, death, stroke, complex regional pain syndrome, myocardial infarction, and need for additional procedures. Concept of substitution vs. replacement discussed.  No guarantees were given regarding results of surgery.      Es Hdez was given the opportunity to ask and have all questions answered today.  The patient voiced understanding of the risks, benefits, and alternative forms of treatment that were discussed and the patient consents to proceed with surgery.       Skin breakdown? WNL  Metal allergy? No  COVID Status:Vaccinated no Booster and History of Positive COVID test - symptomatic  DVT Risk Factors: no significant increased risk factors    DVT Prophylaxis:  Eliquis 2.5mg BID x 4 weeks (ASA allergy)  Walker: needs one ordered  Consults: none  Additional orders: Will need higher pain medication due to chronic pain mangemant - pain management is aware of surgery  Pharmacy: meds to beds    - Patient had positive COVID test 12/31/2022 with fever x 3 days, quarantined for 10 days, today being day 10. She has not had any fever or symptoms in the last 7 days.  She is okay to proceed with surgery at this time as she is outside of her 10-day quarantine, and has remained symptom-free.    Discharge Plan: POD 1 to home, home health and when cleared by physical therapy as safe for discharge    To be updated    Date:  1/9/2023  KLEBER Honeycutt    Dictated Utilizing Dragon Dictation

## 2023-01-10 ENCOUNTER — ANESTHESIA EVENT (OUTPATIENT)
Dept: PERIOP | Facility: HOSPITAL | Age: 63
End: 2023-01-10
Payer: OTHER GOVERNMENT

## 2023-01-10 ENCOUNTER — HOSPITAL ENCOUNTER (OUTPATIENT)
Facility: HOSPITAL | Age: 63
Discharge: HOME | End: 2023-01-11
Attending: ORTHOPAEDIC SURGERY | Admitting: ORTHOPAEDIC SURGERY
Payer: OTHER GOVERNMENT

## 2023-01-10 ENCOUNTER — ANESTHESIA (OUTPATIENT)
Dept: PERIOP | Facility: HOSPITAL | Age: 63
End: 2023-01-10
Payer: OTHER GOVERNMENT

## 2023-01-10 ENCOUNTER — APPOINTMENT (OUTPATIENT)
Dept: GENERAL RADIOLOGY | Facility: HOSPITAL | Age: 63
End: 2023-01-10
Payer: OTHER GOVERNMENT

## 2023-01-10 DIAGNOSIS — M17.12 ARTHRITIS OF LEFT KNEE: ICD-10-CM

## 2023-01-10 DIAGNOSIS — Z96.652 S/P TKR (TOTAL KNEE REPLACEMENT), LEFT: Primary | ICD-10-CM

## 2023-01-10 PROCEDURE — G0378 HOSPITAL OBSERVATION PER HR: HCPCS

## 2023-01-10 PROCEDURE — 25010000002 HYDROMORPHONE 1 MG/ML SOLUTION: Performed by: NURSE ANESTHETIST, CERTIFIED REGISTERED

## 2023-01-10 PROCEDURE — 25010000002 ROPIVACAINE PER 1 MG: Performed by: ORTHOPAEDIC SURGERY

## 2023-01-10 PROCEDURE — 20985 CPTR-ASST DIR MS PX: CPT | Performed by: ORTHOPAEDIC SURGERY

## 2023-01-10 PROCEDURE — 25010000002 FENTANYL CITRATE (PF) 50 MCG/ML SOLUTION: Performed by: NURSE ANESTHETIST, CERTIFIED REGISTERED

## 2023-01-10 PROCEDURE — 97162 PT EVAL MOD COMPLEX 30 MIN: CPT

## 2023-01-10 PROCEDURE — 25010000002 PROPOFOL 10 MG/ML EMULSION: Performed by: NURSE ANESTHETIST, CERTIFIED REGISTERED

## 2023-01-10 PROCEDURE — 97530 THERAPEUTIC ACTIVITIES: CPT

## 2023-01-10 PROCEDURE — 25010000002 EPINEPHRINE 1 MG/ML SOLUTION 30 ML VIAL: Performed by: ORTHOPAEDIC SURGERY

## 2023-01-10 PROCEDURE — 25010000002 ONDANSETRON PER 1 MG: Performed by: NURSE ANESTHETIST, CERTIFIED REGISTERED

## 2023-01-10 PROCEDURE — 25010000002 FENTANYL CITRATE (PF) 100 MCG/2ML SOLUTION: Performed by: NURSE ANESTHETIST, CERTIFIED REGISTERED

## 2023-01-10 PROCEDURE — 25010000002 VANCOMYCIN 750 MG RECONSTITUTED SOLUTION 1 EACH VIAL: Performed by: ORTHOPAEDIC SURGERY

## 2023-01-10 PROCEDURE — C1713 ANCHOR/SCREW BN/BN,TIS/BN: HCPCS | Performed by: ORTHOPAEDIC SURGERY

## 2023-01-10 PROCEDURE — 73560 X-RAY EXAM OF KNEE 1 OR 2: CPT

## 2023-01-10 PROCEDURE — 25010000002 NEOSTIGMINE 5 MG/10ML SOLUTION: Performed by: NURSE ANESTHETIST, CERTIFIED REGISTERED

## 2023-01-10 PROCEDURE — C1776 JOINT DEVICE (IMPLANTABLE): HCPCS | Performed by: ORTHOPAEDIC SURGERY

## 2023-01-10 PROCEDURE — 25010000002 VANCOMYCIN 750 MG RECONSTITUTED SOLUTION 1 EACH VIAL: Performed by: NURSE PRACTITIONER

## 2023-01-10 PROCEDURE — 25010000002 CLONIDINE PER 1 MG: Performed by: ORTHOPAEDIC SURGERY

## 2023-01-10 PROCEDURE — 25010000002 MIDAZOLAM PER 1 MG: Performed by: ANESTHESIOLOGY

## 2023-01-10 PROCEDURE — 25010000002 DEXAMETHASONE SODIUM PHOSPHATE 20 MG/5ML SOLUTION: Performed by: NURSE ANESTHETIST, CERTIFIED REGISTERED

## 2023-01-10 PROCEDURE — 25010000002 HYDROMORPHONE PER 4 MG: Performed by: NURSE ANESTHETIST, CERTIFIED REGISTERED

## 2023-01-10 PROCEDURE — 27447 TOTAL KNEE ARTHROPLASTY: CPT | Performed by: ORTHOPAEDIC SURGERY

## 2023-01-10 DEVICE — SMARTSET HIGH PERFORMANCE MV MEDIUM VISCOSITY BONE CEMENT 40G
Type: IMPLANTABLE DEVICE | Site: KNEE | Status: FUNCTIONAL
Brand: SMARTSET

## 2023-01-10 DEVICE — DEV CONTRL TISS STRATAFIX SYMM PDS PLUS VIL CT-1 60CM: Type: IMPLANTABLE DEVICE | Site: KNEE | Status: FUNCTIONAL

## 2023-01-10 DEVICE — CAP KN ATTUNE FB CMT: Type: IMPLANTABLE DEVICE | Site: KNEE | Status: FUNCTIONAL

## 2023-01-10 DEVICE — ATTUNE PATELLA MEDIALIZED DOME 35MM CEMENTED AOX
Type: IMPLANTABLE DEVICE | Site: KNEE | Status: FUNCTIONAL
Brand: ATTUNE

## 2023-01-10 DEVICE — ATTUNE KNEE SYSTEM TIBIAL INSERT FIXED BEARING CRUCIATE RETAINING 4 7MM AOX
Type: IMPLANTABLE DEVICE | Site: KNEE | Status: FUNCTIONAL
Brand: ATTUNE

## 2023-01-10 DEVICE — ATTUNE KNEE SYSTEM FEMORAL CRUCIATE RETAINING NARROW SIZE 4N LEFT CEMENTED
Type: IMPLANTABLE DEVICE | Site: KNEE | Status: FUNCTIONAL
Brand: ATTUNE

## 2023-01-10 DEVICE — ATTUNE KNEE SYSTEM TIBIAL BASE FIXED BEARING SIZE 4 CEMENTED
Type: IMPLANTABLE DEVICE | Site: KNEE | Status: FUNCTIONAL
Brand: ATTUNE

## 2023-01-10 DEVICE — DEV CONTRL TISS STRATAFIXSPIRALMNCRYL PLSPS2 REV3/0 45CM: Type: IMPLANTABLE DEVICE | Site: KNEE | Status: FUNCTIONAL

## 2023-01-10 RX ORDER — HYDROMORPHONE HYDROCHLORIDE 1 MG/ML
0.5 INJECTION, SOLUTION INTRAMUSCULAR; INTRAVENOUS; SUBCUTANEOUS
Status: DISCONTINUED | OUTPATIENT
Start: 2023-01-10 | End: 2023-01-11 | Stop reason: HOSPADM

## 2023-01-10 RX ORDER — FENTANYL CITRATE 50 UG/ML
INJECTION, SOLUTION INTRAMUSCULAR; INTRAVENOUS AS NEEDED
Status: DISCONTINUED | OUTPATIENT
Start: 2023-01-10 | End: 2023-01-10 | Stop reason: SURG

## 2023-01-10 RX ORDER — ACYCLOVIR 200 MG/1
400 CAPSULE ORAL DAILY
Status: DISCONTINUED | OUTPATIENT
Start: 2023-01-10 | End: 2023-01-11 | Stop reason: HOSPADM

## 2023-01-10 RX ORDER — PROMETHAZINE HYDROCHLORIDE 25 MG/1
25 SUPPOSITORY RECTAL ONCE AS NEEDED
Status: DISCONTINUED | OUTPATIENT
Start: 2023-01-10 | End: 2023-01-10 | Stop reason: HOSPADM

## 2023-01-10 RX ORDER — ACETAMINOPHEN 650 MG/1
650 SUPPOSITORY RECTAL ONCE AS NEEDED
Status: DISCONTINUED | OUTPATIENT
Start: 2023-01-10 | End: 2023-01-10 | Stop reason: HOSPADM

## 2023-01-10 RX ORDER — NALOXONE HCL 0.4 MG/ML
0.1 VIAL (ML) INJECTION
Status: DISCONTINUED | OUTPATIENT
Start: 2023-01-10 | End: 2023-01-11 | Stop reason: HOSPADM

## 2023-01-10 RX ORDER — HYDROCODONE BITARTRATE AND ACETAMINOPHEN 5; 325 MG/1; MG/1
1 TABLET ORAL ONCE AS NEEDED
Status: COMPLETED | OUTPATIENT
Start: 2023-01-10 | End: 2023-01-10

## 2023-01-10 RX ORDER — PROPOFOL 10 MG/ML
VIAL (ML) INTRAVENOUS AS NEEDED
Status: DISCONTINUED | OUTPATIENT
Start: 2023-01-10 | End: 2023-01-10 | Stop reason: SURG

## 2023-01-10 RX ORDER — PANTOPRAZOLE SODIUM 40 MG/1
40 TABLET, DELAYED RELEASE ORAL
Status: DISCONTINUED | OUTPATIENT
Start: 2023-01-10 | End: 2023-01-11 | Stop reason: HOSPADM

## 2023-01-10 RX ORDER — DOXEPIN HYDROCHLORIDE 75 MG/1
75 CAPSULE ORAL NIGHTLY
Status: DISCONTINUED | OUTPATIENT
Start: 2023-01-10 | End: 2023-01-11 | Stop reason: HOSPADM

## 2023-01-10 RX ORDER — PREGABALIN 75 MG/1
150 CAPSULE ORAL ONCE
Status: COMPLETED | OUTPATIENT
Start: 2023-01-10 | End: 2023-01-10

## 2023-01-10 RX ORDER — ONDANSETRON 2 MG/ML
INJECTION INTRAMUSCULAR; INTRAVENOUS AS NEEDED
Status: DISCONTINUED | OUTPATIENT
Start: 2023-01-10 | End: 2023-01-10 | Stop reason: SURG

## 2023-01-10 RX ORDER — BISACODYL 5 MG/1
10 TABLET, DELAYED RELEASE ORAL DAILY PRN
Status: DISCONTINUED | OUTPATIENT
Start: 2023-01-10 | End: 2023-01-11 | Stop reason: HOSPADM

## 2023-01-10 RX ORDER — BISACODYL 10 MG
10 SUPPOSITORY, RECTAL RECTAL DAILY PRN
Status: DISCONTINUED | OUTPATIENT
Start: 2023-01-10 | End: 2023-01-11 | Stop reason: HOSPADM

## 2023-01-10 RX ORDER — OXYBUTYNIN CHLORIDE 10 MG/1
10 TABLET, EXTENDED RELEASE ORAL DAILY
Status: DISCONTINUED | OUTPATIENT
Start: 2023-01-10 | End: 2023-01-11 | Stop reason: HOSPADM

## 2023-01-10 RX ORDER — NALOXONE HCL 0.4 MG/ML
0.2 VIAL (ML) INJECTION AS NEEDED
Status: DISCONTINUED | OUTPATIENT
Start: 2023-01-10 | End: 2023-01-10 | Stop reason: HOSPADM

## 2023-01-10 RX ORDER — ONDANSETRON 2 MG/ML
4 INJECTION INTRAMUSCULAR; INTRAVENOUS EVERY 6 HOURS PRN
Status: DISCONTINUED | OUTPATIENT
Start: 2023-01-10 | End: 2023-01-11 | Stop reason: HOSPADM

## 2023-01-10 RX ORDER — OXYCODONE HYDROCHLORIDE AND ACETAMINOPHEN 5; 325 MG/1; MG/1
1 TABLET ORAL EVERY 4 HOURS PRN
Status: DISCONTINUED | OUTPATIENT
Start: 2023-01-10 | End: 2023-01-11 | Stop reason: HOSPADM

## 2023-01-10 RX ORDER — LABETALOL HYDROCHLORIDE 5 MG/ML
5 INJECTION, SOLUTION INTRAVENOUS
Status: DISCONTINUED | OUTPATIENT
Start: 2023-01-10 | End: 2023-01-10 | Stop reason: HOSPADM

## 2023-01-10 RX ORDER — MAGNESIUM HYDROXIDE 1200 MG/15ML
LIQUID ORAL AS NEEDED
Status: DISCONTINUED | OUTPATIENT
Start: 2023-01-10 | End: 2023-01-10 | Stop reason: HOSPADM

## 2023-01-10 RX ORDER — DOCUSATE SODIUM 100 MG/1
100 CAPSULE, LIQUID FILLED ORAL 2 TIMES DAILY
Status: DISCONTINUED | OUTPATIENT
Start: 2023-01-10 | End: 2023-01-11 | Stop reason: HOSPADM

## 2023-01-10 RX ORDER — POLYETHYLENE GLYCOL 3350 17 G/17G
17 POWDER, FOR SOLUTION ORAL DAILY
Status: DISCONTINUED | OUTPATIENT
Start: 2023-01-10 | End: 2023-01-11 | Stop reason: HOSPADM

## 2023-01-10 RX ORDER — OXYCODONE HYDROCHLORIDE AND ACETAMINOPHEN 5; 325 MG/1; MG/1
2 TABLET ORAL EVERY 4 HOURS PRN
Status: DISCONTINUED | OUTPATIENT
Start: 2023-01-10 | End: 2023-01-11 | Stop reason: HOSPADM

## 2023-01-10 RX ORDER — PROMETHAZINE HYDROCHLORIDE 25 MG/1
25 TABLET ORAL ONCE AS NEEDED
Status: DISCONTINUED | OUTPATIENT
Start: 2023-01-10 | End: 2023-01-10 | Stop reason: HOSPADM

## 2023-01-10 RX ORDER — NEOSTIGMINE METHYLSULFATE 0.5 MG/ML
INJECTION, SOLUTION INTRAVENOUS AS NEEDED
Status: DISCONTINUED | OUTPATIENT
Start: 2023-01-10 | End: 2023-01-10 | Stop reason: SURG

## 2023-01-10 RX ORDER — PHENYLEPHRINE HCL IN 0.9% NACL 1 MG/10 ML
SYRINGE (ML) INTRAVENOUS AS NEEDED
Status: DISCONTINUED | OUTPATIENT
Start: 2023-01-10 | End: 2023-01-10 | Stop reason: SURG

## 2023-01-10 RX ORDER — ACETAMINOPHEN 325 MG/1
325 TABLET ORAL EVERY 4 HOURS PRN
Status: DISCONTINUED | OUTPATIENT
Start: 2023-01-10 | End: 2023-01-11 | Stop reason: HOSPADM

## 2023-01-10 RX ORDER — ACETAMINOPHEN 325 MG/1
650 TABLET ORAL ONCE AS NEEDED
Status: DISCONTINUED | OUTPATIENT
Start: 2023-01-10 | End: 2023-01-10 | Stop reason: HOSPADM

## 2023-01-10 RX ORDER — ACETAMINOPHEN 500 MG
1000 TABLET ORAL ONCE
Status: COMPLETED | OUTPATIENT
Start: 2023-01-10 | End: 2023-01-10

## 2023-01-10 RX ORDER — ALBUTEROL SULFATE 2.5 MG/3ML
2.5 SOLUTION RESPIRATORY (INHALATION) ONCE AS NEEDED
Status: DISCONTINUED | OUTPATIENT
Start: 2023-01-10 | End: 2023-01-10 | Stop reason: HOSPADM

## 2023-01-10 RX ORDER — FOLIC ACID 1 MG/1
2000 TABLET ORAL EVERY MORNING
Status: DISCONTINUED | OUTPATIENT
Start: 2023-01-10 | End: 2023-01-11 | Stop reason: HOSPADM

## 2023-01-10 RX ORDER — GABAPENTIN 400 MG/1
800 CAPSULE ORAL 4 TIMES DAILY
Status: DISCONTINUED | OUTPATIENT
Start: 2023-01-10 | End: 2023-01-11 | Stop reason: HOSPADM

## 2023-01-10 RX ORDER — TRANEXAMIC ACID 100 MG/ML
INJECTION, SOLUTION INTRAVENOUS AS NEEDED
Status: DISCONTINUED | OUTPATIENT
Start: 2023-01-10 | End: 2023-01-10 | Stop reason: SURG

## 2023-01-10 RX ORDER — HYDRALAZINE HYDROCHLORIDE 20 MG/ML
5 INJECTION INTRAMUSCULAR; INTRAVENOUS
Status: DISCONTINUED | OUTPATIENT
Start: 2023-01-10 | End: 2023-01-10 | Stop reason: HOSPADM

## 2023-01-10 RX ORDER — DEXAMETHASONE SODIUM PHOSPHATE 4 MG/ML
INJECTION, SOLUTION INTRA-ARTICULAR; INTRALESIONAL; INTRAMUSCULAR; INTRAVENOUS; SOFT TISSUE AS NEEDED
Status: DISCONTINUED | OUTPATIENT
Start: 2023-01-10 | End: 2023-01-10 | Stop reason: SURG

## 2023-01-10 RX ORDER — FENTANYL CITRATE 50 UG/ML
50 INJECTION, SOLUTION INTRAMUSCULAR; INTRAVENOUS
Status: DISCONTINUED | OUTPATIENT
Start: 2023-01-10 | End: 2023-01-10 | Stop reason: HOSPADM

## 2023-01-10 RX ORDER — SODIUM CHLORIDE, SODIUM LACTATE, POTASSIUM CHLORIDE, CALCIUM CHLORIDE 600; 310; 30; 20 MG/100ML; MG/100ML; MG/100ML; MG/100ML
100 INJECTION, SOLUTION INTRAVENOUS CONTINUOUS
Status: DISCONTINUED | OUTPATIENT
Start: 2023-01-10 | End: 2023-01-11 | Stop reason: HOSPADM

## 2023-01-10 RX ORDER — EPHEDRINE SULFATE 50 MG/ML
INJECTION INTRAVENOUS AS NEEDED
Status: DISCONTINUED | OUTPATIENT
Start: 2023-01-10 | End: 2023-01-10 | Stop reason: SURG

## 2023-01-10 RX ORDER — FLUMAZENIL 0.1 MG/ML
0.2 INJECTION INTRAVENOUS AS NEEDED
Status: DISCONTINUED | OUTPATIENT
Start: 2023-01-10 | End: 2023-01-10 | Stop reason: HOSPADM

## 2023-01-10 RX ORDER — ONDANSETRON 4 MG/1
4 TABLET, FILM COATED ORAL EVERY 6 HOURS PRN
Status: DISCONTINUED | OUTPATIENT
Start: 2023-01-10 | End: 2023-01-11 | Stop reason: HOSPADM

## 2023-01-10 RX ORDER — SODIUM CHLORIDE 0.9 % (FLUSH) 0.9 %
3-10 SYRINGE (ML) INJECTION AS NEEDED
Status: DISCONTINUED | OUTPATIENT
Start: 2023-01-10 | End: 2023-01-10 | Stop reason: HOSPADM

## 2023-01-10 RX ORDER — SODIUM CHLORIDE, SODIUM LACTATE, POTASSIUM CHLORIDE, CALCIUM CHLORIDE 600; 310; 30; 20 MG/100ML; MG/100ML; MG/100ML; MG/100ML
9 INJECTION, SOLUTION INTRAVENOUS CONTINUOUS
Status: DISCONTINUED | OUTPATIENT
Start: 2023-01-10 | End: 2023-01-11 | Stop reason: HOSPADM

## 2023-01-10 RX ORDER — LIDOCAINE HYDROCHLORIDE 20 MG/ML
INJECTION, SOLUTION INFILTRATION; PERINEURAL AS NEEDED
Status: DISCONTINUED | OUTPATIENT
Start: 2023-01-10 | End: 2023-01-10 | Stop reason: SURG

## 2023-01-10 RX ORDER — HYDROMORPHONE HYDROCHLORIDE 1 MG/ML
0.25 INJECTION, SOLUTION INTRAMUSCULAR; INTRAVENOUS; SUBCUTANEOUS
Status: DISCONTINUED | OUTPATIENT
Start: 2023-01-10 | End: 2023-01-10 | Stop reason: HOSPADM

## 2023-01-10 RX ORDER — GLYCOPYRROLATE 0.2 MG/ML
INJECTION INTRAMUSCULAR; INTRAVENOUS AS NEEDED
Status: DISCONTINUED | OUTPATIENT
Start: 2023-01-10 | End: 2023-01-10 | Stop reason: SURG

## 2023-01-10 RX ORDER — LIDOCAINE HYDROCHLORIDE 10 MG/ML
0.5 INJECTION, SOLUTION EPIDURAL; INFILTRATION; INTRACAUDAL; PERINEURAL ONCE AS NEEDED
Status: COMPLETED | OUTPATIENT
Start: 2023-01-10 | End: 2023-01-10

## 2023-01-10 RX ORDER — MONTELUKAST SODIUM 10 MG/1
10 TABLET ORAL NIGHTLY
Status: DISCONTINUED | OUTPATIENT
Start: 2023-01-10 | End: 2023-01-11 | Stop reason: HOSPADM

## 2023-01-10 RX ORDER — ROCURONIUM BROMIDE 10 MG/ML
INJECTION, SOLUTION INTRAVENOUS AS NEEDED
Status: DISCONTINUED | OUTPATIENT
Start: 2023-01-10 | End: 2023-01-10 | Stop reason: SURG

## 2023-01-10 RX ORDER — AMLODIPINE BESYLATE 10 MG/1
10 TABLET ORAL DAILY
Status: DISCONTINUED | OUTPATIENT
Start: 2023-01-10 | End: 2023-01-11 | Stop reason: HOSPADM

## 2023-01-10 RX ORDER — KETAMINE HCL IN NACL, ISO-OSM 100MG/10ML
SYRINGE (ML) INJECTION AS NEEDED
Status: DISCONTINUED | OUTPATIENT
Start: 2023-01-10 | End: 2023-01-10 | Stop reason: SURG

## 2023-01-10 RX ORDER — ONDANSETRON 2 MG/ML
4 INJECTION INTRAMUSCULAR; INTRAVENOUS ONCE AS NEEDED
Status: DISCONTINUED | OUTPATIENT
Start: 2023-01-10 | End: 2023-01-10 | Stop reason: HOSPADM

## 2023-01-10 RX ORDER — MIDAZOLAM HYDROCHLORIDE 1 MG/ML
1 INJECTION INTRAMUSCULAR; INTRAVENOUS
Status: DISCONTINUED | OUTPATIENT
Start: 2023-01-10 | End: 2023-01-10 | Stop reason: HOSPADM

## 2023-01-10 RX ORDER — SODIUM CHLORIDE 0.9 % (FLUSH) 0.9 %
3 SYRINGE (ML) INJECTION EVERY 12 HOURS SCHEDULED
Status: DISCONTINUED | OUTPATIENT
Start: 2023-01-10 | End: 2023-01-10 | Stop reason: HOSPADM

## 2023-01-10 RX ORDER — FAMOTIDINE 10 MG/ML
20 INJECTION, SOLUTION INTRAVENOUS ONCE
Status: COMPLETED | OUTPATIENT
Start: 2023-01-10 | End: 2023-01-10

## 2023-01-10 RX ADMIN — Medication 10 MG: at 09:30

## 2023-01-10 RX ADMIN — Medication 10 MG: at 09:20

## 2023-01-10 RX ADMIN — VANCOMYCIN HYDROCHLORIDE 750 MG: 750 INJECTION, POWDER, LYOPHILIZED, FOR SOLUTION INTRAVENOUS at 08:42

## 2023-01-10 RX ADMIN — VANCOMYCIN HYDROCHLORIDE 750 MG: 750 INJECTION, POWDER, LYOPHILIZED, FOR SOLUTION INTRAVENOUS at 20:43

## 2023-01-10 RX ADMIN — ROCURONIUM BROMIDE 25 MG: 50 INJECTION INTRAVENOUS at 09:20

## 2023-01-10 RX ADMIN — SODIUM CHLORIDE, POTASSIUM CHLORIDE, SODIUM LACTATE AND CALCIUM CHLORIDE 100 ML/HR: 600; 310; 30; 20 INJECTION, SOLUTION INTRAVENOUS at 15:14

## 2023-01-10 RX ADMIN — SODIUM CHLORIDE, POTASSIUM CHLORIDE, SODIUM LACTATE AND CALCIUM CHLORIDE: 600; 310; 30; 20 INJECTION, SOLUTION INTRAVENOUS at 10:15

## 2023-01-10 RX ADMIN — ACYCLOVIR 400 MG: 200 CAPSULE ORAL at 16:19

## 2023-01-10 RX ADMIN — EPHEDRINE SULFATE 10 MG: 50 INJECTION INTRAVENOUS at 09:34

## 2023-01-10 RX ADMIN — OXYCODONE AND ACETAMINOPHEN 1 TABLET: 5; 325 TABLET ORAL at 22:15

## 2023-01-10 RX ADMIN — TRANEXAMIC ACID 1000 MG: 100 INJECTION, SOLUTION INTRAVENOUS at 09:43

## 2023-01-10 RX ADMIN — DEXAMETHASONE SODIUM PHOSPHATE 8 MG: 4 INJECTION, SOLUTION INTRAMUSCULAR; INTRAVENOUS at 09:35

## 2023-01-10 RX ADMIN — OXYBUTYNIN CHLORIDE 10 MG: 10 TABLET, EXTENDED RELEASE ORAL at 16:18

## 2023-01-10 RX ADMIN — MONTELUKAST SODIUM 10 MG: 10 TABLET, FILM COATED ORAL at 22:15

## 2023-01-10 RX ADMIN — HYDROCODONE BITARTRATE AND ACETAMINOPHEN 1 TABLET: 5; 325 TABLET ORAL at 12:00

## 2023-01-10 RX ADMIN — METOPROLOL TARTRATE 75 MG: 25 TABLET, FILM COATED ORAL at 22:14

## 2023-01-10 RX ADMIN — HYDROMORPHONE HYDROCHLORIDE 0.25 MG: 1 INJECTION, SOLUTION INTRAMUSCULAR; INTRAVENOUS; SUBCUTANEOUS at 12:25

## 2023-01-10 RX ADMIN — SODIUM CHLORIDE, POTASSIUM CHLORIDE, SODIUM LACTATE AND CALCIUM CHLORIDE 9 ML/HR: 600; 310; 30; 20 INJECTION, SOLUTION INTRAVENOUS at 07:41

## 2023-01-10 RX ADMIN — NEOSTIGMINE METHYLSULFATE 3.5 MG: 0.5 INJECTION INTRAVENOUS at 11:01

## 2023-01-10 RX ADMIN — ONDANSETRON 4 MG: 2 INJECTION INTRAMUSCULAR; INTRAVENOUS at 10:55

## 2023-01-10 RX ADMIN — Medication 100 MCG: at 10:49

## 2023-01-10 RX ADMIN — OXYCODONE AND ACETAMINOPHEN 2 TABLET: 5; 325 TABLET ORAL at 15:14

## 2023-01-10 RX ADMIN — PANTOPRAZOLE SODIUM 40 MG: 40 TABLET, DELAYED RELEASE ORAL at 17:25

## 2023-01-10 RX ADMIN — HYDROMORPHONE HYDROCHLORIDE 0.25 MG: 1 INJECTION, SOLUTION INTRAMUSCULAR; INTRAVENOUS; SUBCUTANEOUS at 10:17

## 2023-01-10 RX ADMIN — GABAPENTIN 800 MG: 400 CAPSULE ORAL at 17:25

## 2023-01-10 RX ADMIN — PROPOFOL 100 MG: 10 INJECTION, EMULSION INTRAVENOUS at 09:20

## 2023-01-10 RX ADMIN — PREGABALIN 150 MG: 75 CAPSULE ORAL at 07:41

## 2023-01-10 RX ADMIN — FOLIC ACID 2000 MCG: 1 TABLET ORAL at 16:18

## 2023-01-10 RX ADMIN — FENTANYL CITRATE 25 MCG: 50 INJECTION, SOLUTION INTRAMUSCULAR; INTRAVENOUS at 09:44

## 2023-01-10 RX ADMIN — LIDOCAINE HYDROCHLORIDE 80 MG: 20 INJECTION, SOLUTION INFILTRATION; PERINEURAL at 09:20

## 2023-01-10 RX ADMIN — POLYETHYLENE GLYCOL 3350 17 G: 17 POWDER, FOR SOLUTION ORAL at 15:14

## 2023-01-10 RX ADMIN — FAMOTIDINE 20 MG: 10 INJECTION INTRAVENOUS at 08:09

## 2023-01-10 RX ADMIN — FENTANYL CITRATE 50 MCG: 50 INJECTION, SOLUTION INTRAMUSCULAR; INTRAVENOUS at 11:57

## 2023-01-10 RX ADMIN — DOCUSATE SODIUM 100 MG: 100 CAPSULE, LIQUID FILLED ORAL at 22:15

## 2023-01-10 RX ADMIN — LIDOCAINE HYDROCHLORIDE 0.5 ML: 10 INJECTION, SOLUTION EPIDURAL; INFILTRATION; INTRACAUDAL; PERINEURAL at 07:41

## 2023-01-10 RX ADMIN — MIDAZOLAM 1 MG: 1 INJECTION INTRAMUSCULAR; INTRAVENOUS at 08:08

## 2023-01-10 RX ADMIN — GABAPENTIN 800 MG: 400 CAPSULE ORAL at 22:14

## 2023-01-10 RX ADMIN — DOXEPIN HYDROCHLORIDE 75 MG: 75 CAPSULE ORAL at 22:15

## 2023-01-10 RX ADMIN — ACETAMINOPHEN 1000 MG: 500 TABLET, FILM COATED ORAL at 07:41

## 2023-01-10 RX ADMIN — FENTANYL CITRATE 25 MCG: 50 INJECTION, SOLUTION INTRAMUSCULAR; INTRAVENOUS at 09:35

## 2023-01-10 RX ADMIN — Medication 100 MCG: at 09:50

## 2023-01-10 RX ADMIN — HYDROMORPHONE HYDROCHLORIDE 0.25 MG: 1 INJECTION, SOLUTION INTRAMUSCULAR; INTRAVENOUS; SUBCUTANEOUS at 10:05

## 2023-01-10 RX ADMIN — GLYCOPYRROLATE 0.4 MCG: 1 INJECTION INTRAMUSCULAR; INTRAVENOUS at 11:01

## 2023-01-10 NOTE — ANESTHESIA PREPROCEDURE EVALUATION
Anesthesia Evaluation     Patient summary reviewed and Nursing notes reviewed   no history of anesthetic complications:  NPO Solid Status: > 8 hours  NPO Liquid Status: > 2 hours           Airway   Mallampati: II  Dental    (+) edentulous    Pulmonary    (+) a smoker Current Smoked day of surgery,   Cardiovascular - negative cardio ROS  Exercise tolerance: good (4-7 METS)    ECG reviewed  Rhythm: regular      ROS comment: LBBB    Neuro/Psych  (+) numbness,    GI/Hepatic/Renal/Endo    (+)  GERD,      ROS Comment: Slow gastric motility.    Musculoskeletal     Abdominal    Substance History - negative use     OB/GYN negative ob/gyn ROS         Other   arthritis,                      Anesthesia Plan    ASA 2     general     (There were no vitals taken for this visit.    I have reviewed the patient's history with the patient and the chart, including all pertinent laboratory results and imaging. I have explained the risks of anesthesia including but not limited to dental damage, corneal abrasion, nerve injury, MI, stroke, and death.    )  intravenous induction     Anesthetic plan, risks, benefits, and alternatives have been provided, discussed and informed consent has been obtained with: patient.        CODE STATUS:

## 2023-01-10 NOTE — ANESTHESIA POSTPROCEDURE EVALUATION
Patient: Es Hdez    Procedure Summary     Date: 01/10/23 Room / Location:  [facility] [address] [place] [address] [facility]    Anesthesia Start: 0916 Anesthesia Stop: 1122    Procedure: LEFT TOTAL KNEE ARTHROPLASTY WITH MIGUELANGEL NAVIGATION (Left: Knee) Diagnosis:       Arthritis of left knee      (Arthritis of left knee [M17.12])    Surgeons: Nicanor Benavides MD Provider: Shayy Wilson MD    Anesthesia Type: general ASA Status: 2          Anesthesia Type: general    Vitals  Vitals Value Taken Time   /61 01/10/23 1215   Temp 36.9 °C (98.4 °F) 01/10/23 1120   Pulse 78 01/10/23 1229   Resp 16 01/10/23 1215   SpO2 95 % 01/10/23 1229   Vitals shown include unvalidated device data.        Post Anesthesia Care and Evaluation    Patient location during evaluation: bedside  Patient participation: complete - patient participated  Level of consciousness: awake  Pain management: adequate    Airway patency: patent  Anesthetic complications: No anesthetic complications  PONV Status: controlled  Cardiovascular status: acceptable  Respiratory status: acceptable  Hydration status: acceptable    Comments: /61   Pulse 81   Temp 36.9 °C (98.4 °F) (Oral)   Resp 16   SpO2 95%

## 2023-01-10 NOTE — PLAN OF CARE
Goal Outcome Evaluation:  Plan of Care Reviewed With: patient           Outcome Evaluation: Pt is a 61 y/o F POD0 L TKA. Pt lives alone, but will have a friend staying with her at D/C. Pt has 0 BRANDON and lives in a single story home, has rollator at home but would benefit from RW. Pt agreeable to work with PT, SBA for bed mobility and CGA for STS and to ambulate 45'. Pt demo's step to gait pattern and cues for proper gait mechanics given during session. Pt demo's dec'd strength, endurance, and balance and would benefit from skilled PT intervention. Rec D/C home with 24/7 assist and HHPT.

## 2023-01-10 NOTE — ANESTHESIA PROCEDURE NOTES
Airway  Urgency: elective    Date/Time: 1/10/2023 9:24 AM  Airway not difficult    General Information and Staff    Patient location during procedure: OR  Anesthesiologist: Shayy Wilson MD  CRNA/CAA: Mary Mejia CRNA    Indications and Patient Condition  Indications for airway management: airway protection    Preoxygenated: yes (pt pre-O2 with 100% O2)  Mask difficulty assessment: 2 - vent by mask + OA or adjuvant +/- NMBA (easy BMV )    Final Airway Details  Final airway type: endotracheal airway      Successful airway: ETT  Cuffed: yes   Successful intubation technique: direct laryngoscopy  Endotracheal tube insertion site: oral  Blade: Nicolas  Blade size: 3  ETT size (mm): 7.0  Cormack-Lehane Classification: grade I - full view of glottis  Placement verified by: chest auscultation and capnometry   Cuff volume (mL): 7  Measured from: lips  ETT/EBT  to lips (cm): 20  Number of attempts at approach: 1  Assessment: lips, teeth, and gum same as pre-op and atraumatic intubation    Additional Comments  ATOETx1. No change in dentition.

## 2023-01-10 NOTE — PLAN OF CARE
Goal Outcome Evaluation:      Patient assist x1, patient uses forearm crutches to get around. BSC, voids multiple times. O/Ax4, paint controlled by PO meds  Problem: Adult Inpatient Plan of Care  Goal: Absence of Hospital-Acquired Illness or Injury  Intervention: Identify and Manage Fall Risk  Recent Flowsheet Documentation  Taken 1/10/2023 1621 by Azra Mullins RN  Safety Promotion/Fall Prevention:   activity supervised   assistive device/personal items within reach   clutter free environment maintained   fall prevention program maintained   safety round/check completed  Taken 1/10/2023 1400 by Azra Mullins RN  Safety Promotion/Fall Prevention:   activity supervised   assistive device/personal items within reach   clutter free environment maintained   fall prevention program maintained   safety round/check completed  Taken 1/10/2023 1327 by Azra Mullins RN  Safety Promotion/Fall Prevention:   activity supervised   assistive device/personal items within reach   clutter free environment maintained   fall prevention program maintained   safety round/check completed  Intervention: Prevent Skin Injury  Recent Flowsheet Documentation  Taken 1/10/2023 1621 by Azra Mullins RN  Body Position: position changed independently  Taken 1/10/2023 1400 by Azra Mullins RN  Body Position:   sitting up in bed   neutral body alignment   supine  Taken 1/10/2023 1327 by Azra Mullins RN  Body Position:   position changed independently   supine   neutral body alignment   lower extremity elevated  Skin Protection:   adhesive use limited   protective footwear used  Intervention: Prevent and Manage VTE (Venous Thromboembolism) Risk  Recent Flowsheet Documentation  Taken 1/10/2023 1621 by Azra Mullins RN  Activity Management:   up to bedside commode   activity adjusted per tolerance  Taken 1/10/2023 1327 by Azra Mullins RN  Activity Management:   ambulated in room   dorsiflexion/plantar flexion performed   up to bedside commode  VTE  Prevention/Management:   bilateral   foot pump device off  Range of Motion: active ROM (range of motion) encouraged  Goal: Optimal Comfort and Wellbeing  Intervention: Monitor Pain and Promote Comfort  Recent Flowsheet Documentation  Taken 1/10/2023 1514 by Azra Mullins RN  Pain Management Interventions: see MAR  Intervention: Provide Person-Centered Care  Recent Flowsheet Documentation  Taken 1/10/2023 1327 by Azra Mullins RN  Trust Relationship/Rapport:   care explained   thoughts/feelings acknowledged  Goal: Readiness for Transition of Care  Intervention: Mutually Develop Transition Plan  Recent Flowsheet Documentation  Taken 1/10/2023 1344 by Azra Mullins RN  Transportation Anticipated: family or friend will provide  Patient/Family Anticipated Services at Transition: none  Patient/Family Anticipates Transition to: home  Taken 1/10/2023 1343 by Azra Mullins RN  Equipment Currently Used at Home: none     Problem: Functional Ability Impaired (Knee Arthroplasty)  Goal: Optimal Functional Ability  Intervention: Promote Optimal Functional Status  Recent Flowsheet Documentation  Taken 1/10/2023 1621 by Azra Mullins RN  Activity Management:   up to bedside commode   activity adjusted per tolerance  Taken 1/10/2023 1327 by Azra Mullins RN  Activity Management:   ambulated in room   dorsiflexion/plantar flexion performed   up to bedside commode  Assistive Device Utilized: (forearm crutches)   gait belt   crutches   other (see comments)     Problem: Ongoing Anesthesia Effects (Knee Arthroplasty)  Goal: Anesthesia/Sedation Recovery  Intervention: Optimize Anesthesia Recovery  Recent Flowsheet Documentation  Taken 1/10/2023 1621 by Azra Mullins RN  Safety Promotion/Fall Prevention:   activity supervised   assistive device/personal items within reach   clutter free environment maintained   fall prevention program maintained   safety round/check completed  Taken 1/10/2023 1400 by Azra Mullins RN  Safety Promotion/Fall  Prevention:   activity supervised   assistive device/personal items within reach   clutter free environment maintained   fall prevention program maintained   safety round/check completed  Taken 1/10/2023 1327 by Azra Mullins RN  Safety Promotion/Fall Prevention:   activity supervised   assistive device/personal items within reach   clutter free environment maintained   fall prevention program maintained   safety round/check completed  Administration (IS): instruction provided, initial  Incentive Spirometer Predicted Level (mL): 2000     Problem: Pain (Knee Arthroplasty)  Goal: Acceptable Pain Control  Intervention: Prevent or Manage Pain  Recent Flowsheet Documentation  Taken 1/10/2023 1514 by Azra Mullins RN  Pain Management Interventions: see MAR     Problem: Respiratory Compromise (Knee Arthroplasty)  Goal: Effective Oxygenation and Ventilation  Intervention: Optimize Oxygenation and Ventilation  Recent Flowsheet Documentation  Taken 1/10/2023 1621 by Azra Mullins RN  Head of Bed (HOB) Positioning: HOB at 20 degrees  Taken 1/10/2023 1400 by Azra Mullins RN  Head of Bed (HOB) Positioning: HOB at 20 degrees  Taken 1/10/2023 1327 by Azra Mullins RN  Administration (IS): instruction provided, initial  Head of Bed (HOB) Positioning: HOB at 20 degrees  Incentive Spirometer Predicted Level (mL): 2000     Problem: Fall Injury Risk  Goal: Absence of Fall and Fall-Related Injury  Intervention: Identify and Manage Contributors  Recent Flowsheet Documentation  Taken 1/10/2023 1621 by Azra Mullins RN  Medication Review/Management: medications reviewed  Taken 1/10/2023 1400 by Azra Mullins RN  Medication Review/Management: medications reviewed  Taken 1/10/2023 1327 by Azra Mullins RN  Medication Review/Management: medications reviewed  Intervention: Promote Injury-Free Environment  Recent Flowsheet Documentation  Taken 1/10/2023 1621 by Azra Mullins RN  Safety Promotion/Fall Prevention:   activity supervised   assistive  device/personal items within reach   clutter free environment maintained   fall prevention program maintained   safety round/check completed  Taken 1/10/2023 1400 by Azra Mullins RN  Safety Promotion/Fall Prevention:   activity supervised   assistive device/personal items within reach   clutter free environment maintained   fall prevention program maintained   safety round/check completed  Taken 1/10/2023 1327 by Azra Mullins RN  Safety Promotion/Fall Prevention:   activity supervised   assistive device/personal items within reach   clutter free environment maintained   fall prevention program maintained   safety round/check completed     Problem: Pain Chronic (Persistent)  Goal: Acceptable Pain Control and Functional Ability  Intervention: Manage Persistent Pain  Recent Flowsheet Documentation  Taken 1/10/2023 1621 by Azra Mullins RN  Medication Review/Management: medications reviewed  Taken 1/10/2023 1400 by Azra Mullins RN  Medication Review/Management: medications reviewed  Taken 1/10/2023 1327 by Azra Mullins RN  Medication Review/Management: medications reviewed  Intervention: Develop Pain Management Plan  Recent Flowsheet Documentation  Taken 1/10/2023 1514 by Azra Mullins RN  Pain Management Interventions: see MAR  Intervention: Optimize Psychosocial Wellbeing  Recent Flowsheet Documentation  Taken 1/10/2023 1327 by Azra Mullins RN  Family/Support System Care: self-care encouraged

## 2023-01-10 NOTE — THERAPY EVALUATION
Patient Name: Es Hdez  : 1960    MRN: [id number]                              Today's Date: 1/10/2023       Admit Date: 1/10/2023    Visit Dx:     ICD-10-CM ICD-9-CM   1. Arthritis of left knee  M17.12 716.96     Patient Active Problem List   Diagnosis   • Arthritis of left knee     Past Medical History:   Diagnosis Date   • Acromioclavicular separation    • Arthritis of back    • Arthritis of neck    • Bladder spasms    • Cervical disc disorder    • CTS (carpal tunnel syndrome) ?   • Dislocated elbow    • Dislocation of finger    • Dislocation, shoulder    • Fracture of wrist    • Fracture, clavicle    • Fracture, finger    • Frozen shoulder    • GERD (gastroesophageal reflux disease)    • History of GI bleed    • History of left bundle branch block (LBBB)     EKGs from  [facility] 2022, Also worked up by [facility] 2020 oer oatient with echo, request for records sent   • Knee sprain    • Knee swelling    • Low back strain    • Lumbosacral disc disease    • Neck strain    • Partial deafness of right ear    • Periarthritis of shoulder    • Rotator cuff syndrome    • Scoliosis    • Slow gastric motility    • Stress fracture    • Tear of meniscus of knee 187   • Tendinitis of knee ?   • Tennis elbow    • Thoracic disc disorder    • Wrist sprain 1090     Past Surgical History:   Procedure Laterality Date   • BACK SURGERY     • ELBOW PROCEDURE     • HAND SURGERY     • HYSTERECTOMY     • KNEE SURGERY     • NECK SURGERY     • SHOULDER SURGERY     • TRIGGER POINT INJECTION     • WRIST SURGERY        General Information     Row Name 01/10/23 1516          Physical Therapy Time and Intention    Document Type evaluation  -DB     Mode of Treatment individual therapy;physical therapy  -DB     Row Name 01/10/23 1516          General Information    Patient Profile Reviewed yes  -DB     Prior Level of Function independent:  -DB      Existing Precautions/Restrictions fall  -DB     Barriers to Rehab none identified  -DB     Row Name 01/10/23 1516          Living Environment    People in Home alone  friend staying with pt at D/C  -DB     Row Name 01/10/23 1516          Home Main Entrance    Number of Stairs, Main Entrance none  -DB     Row Name 01/10/23 1516          Stairs Within Home, Primary    Number of Stairs, Within Home, Primary none  -DB     Row Name 01/10/23 1516          Cognition    Orientation Status (Cognition) oriented x 4  -DB     Row Name 01/10/23 1516          Safety Issues, Functional Mobility    Impairments Affecting Function (Mobility) balance;strength;pain;endurance/activity tolerance  -DB           User Key  (r) = Recorded By, (t) = Taken By, (c) = Cosigned By    Initials Name Provider Type    DB Jacquelyn Kenney, JONATHAN Physical Therapist               Mobility     Row Name 01/10/23 1516          Bed Mobility    Bed Mobility supine-sit;sit-supine  -DB     Supine-Sit [place] (Bed Mobility) standby assist  -DB     Sit-Supine [place] (Bed Mobility) standby assist  -DB     Assistive Device (Bed Mobility) bed rails;head of bed elevated  -DB     Row Name 01/10/23 1516          Sit-Stand Transfer    Sit-Stand [place] (Transfers) contact guard;verbal cues  -DB     Assistive Device (Sit-Stand Transfers) walker, front-wheeled  -DB     [facility] Name 01/10/23 1516          Gait/Stairs (Locomotion)    [place] Level (Gait) contact guard;verbal cues  -DB     Assistive Device (Gait) walker, front-wheeled  -DB     Distance in Feet (Gait) 45'  -DB     Deviations/Abnormal Patterns (Gait) stride length decreased;weight shifting decreased;antalgic;gait speed decreased  -DB     Bilateral Gait Deviations forward flexed posture  -DB     Row Name 01/10/23 1516          Mobility    Extremity Weight-bearing Status left lower extremity  -DB     Left Lower Extremity (Weight-bearing Status) weight-bearing as tolerated (WBAT)  -DB            User Key  (r) = Recorded By, (t) = Taken By, (c) = Cosigned By    Initials Name Provider Type    DB Jacquelyn Kenney PT Physical Therapist               Obj/Interventions     Row Name 01/10/23 1518          Range of Motion Comprehensive    Comment, General Range of Motion LLE knee 5-95 degrees AROM  -DB     Row Name 01/10/23 1518          Strength Comprehensive (MMT)    Comment, General Manual Muscle Testing (MMT) Assessment post op weakness  -DB     Row Name 01/10/23 1518          Balance    Balance Assessment sitting static balance;sitting dynamic balance;standing static balance;standing dynamic balance  -DB     Static Sitting Balance standby assist  -DB     Dynamic Sitting Balance standby assist  -DB     Static Standing Balance standby assist  -DB     Dynamic Standing Balance contact guard  -DB     Position/Device Used, Standing Balance supported;walker, front-wheeled  -DB     Balance Interventions sitting;standing;sit to stand  -DB           User Key  (r) = Recorded By, (t) = Taken By, (c) = Cosigned By    Initials Name Provider Type    DB Jacquelyn Kenney, PT Physical Therapist               Goals/Plan     Row Name 01/10/23 1522          Bed Mobility Goal 1 (PT)    Activity/Assistive Device (Bed Mobility Goal 1, PT) bed mobility activities, all  -DB     [place] Level/Cues Needed (Bed Mobility Goal 1, PT) modified independence  -DB     Time Frame (Bed Mobility Goal 1, PT) 1 week  -DB     Row Name 01/10/23 1522          Transfer Goal 1 (PT)    Activity/Assistive Device (Transfer Goal 1, PT) transfers, all  -DB     [place] Level/Cues Needed (Transfer Goal 1, PT) standby assist  -DB     Time Frame (Transfer Goal 1, PT) 1 week  -DB     Row Name 01/10/23 1522          Gait Training Goal 1 (PT)    Activity/Assistive Device (Gait Training Goal 1, PT) gait (walking locomotion)  -DB     [place] Level (Gait Training Goal 1, PT) standby assist  -DB     Time Frame (Gait Training Goal 1, PT) 1 week  -DB      Row Name 01/10/23 1522          Therapy Assessment/Plan (PT)    Planned Therapy Interventions (PT) balance training;bed mobility training;gait training;transfer training;home exercise program;patient/family education;neuromuscular re-education;ROM (range of motion);stair training;strengthening  -DB           User Key  (r) = Recorded By, (t) = Taken By, (c) = Cosigned By    Initials Name Provider Type    DB Jacquelyn Kenney, JONATHAN Physical Therapist               Clinical Impression     Row Name 01/10/23 1518          Pain    Pain Intervention(s) Ambulation/increased activity;Repositioned  -DB     Row Name 01/10/23 1518          Plan of Care Review    Plan of Care Reviewed With patient  -DB     Outcome Evaluation Pt is a 63 y/o F POD0 L TKA. Pt lives alone, but will have a friend staying with her at D/C. Pt has 0 BRANDON and lives in a single story home, has rollator at home but would benefit from RW. Pt agreeable to work with PT, SBA for bed mobility and CGA for STS and to ambulate 45'. Pt demo's step to gait pattern and cues for proper gait mechanics given during session. Pt demo's dec'd strength, endurance, and balance and would benefit from skilled PT intervention. Rec D/C home with 24/7 assist and HHPT.  -DB     Row Name 01/10/23 1518          Therapy Assessment/Plan (PT)    Rehab Potential (PT) good, to achieve stated therapy goals  -DB     Criteria for Skilled Interventions Met (PT) yes  -DB     Therapy Frequency (PT) daily  -DB     Row Name 01/10/23 1518          Vital Signs    O2 Delivery Pre Treatment supplemental O2  -DB     O2 Delivery Intra Treatment room air  -DB     O2 Delivery Post Treatment supplemental O2  -DB     Pre Patient Position Supine  -DB     Intra Patient Position Standing  -DB     Post Patient Position Supine  -DB     Row Name 01/10/23 1518          Positioning and Restraints    Pre-Treatment Position in bed  -DB     Post Treatment Position bed  -DB     In Bed supine;call light within  reach;encouraged to call for assist;exit alarm on;with nsg  -DB           User Key  (r) = Recorded By, (t) = Taken By, (c) = Cosigned By    Initials Name Provider Type    Jacquelyn Syed PT Physical Therapist               Outcome Measures     Row Name 01/10/23 1522 01/10/23 1327       How much help from another person do you currently need...    Turning from your back to your side while in flat bed without using bedrails? 4  -DB 4  -JERONIMO    Moving from lying on back to sitting on the side of a flat bed without bedrails? 4  -DB 3  -JERONIMO    Moving to and from a bed to a chair (including a wheelchair)? 3  -DB 3  -JERONIMO    Standing up from a chair using your arms (e.g., wheelchair, bedside chair)? 3  -DB 3  -JERONIMO    Climbing 3-5 steps with a railing? 2  -DB 3  -JERONIMO    To walk in hospital room? 3  -DB 3  -JERONIMO    AM-PAC 6 Clicks Score (PT) 19  -DB 19  -JERONIMO    Highest level of mobility 6 --> Walked 10 steps or more  -DB 6 --> Walked 10 steps or more  -JERONIMO    Row Name 01/10/23 1522          Functional Assessment    Outcome Measure Options AM-PAC 6 Clicks Basic Mobility (PT)  -DB           User Key  (r) = Recorded By, (t) = Taken By, (c) = Cosigned By    Initials Name Provider Type    Jacquelyn Syed, PT Physical Therapist    Azra Howe, RN Registered Nurse                             Physical Therapy Education     Title: PT OT SLP Therapies (Done)     Topic: Physical Therapy (Done)     Point: Mobility training (Done)     Learning Progress Summary           Patient Acceptance, E, VU by DB at 1/10/2023 1523                   Point: Home exercise program (Done)     Learning Progress Summary           Patient Acceptance, E, VU by DB at 1/10/2023 1523                   Point: Body mechanics (Done)     Learning Progress Summary           Patient Acceptance, E, VU by DB at 1/10/2023 1523                   Point: Precautions (Done)     Learning Progress Summary           Patient Acceptance, E, VU by DB at 1/10/2023 1523                                User Key     Initials Effective Dates Name Provider Type Discipline    DB 06/16/21 -  Jacquelyn Kenney PT Physical Therapist PT              PT Recommendation and Plan  Planned Therapy Interventions (PT): balance training, bed mobility training, gait training, transfer training, home exercise program, patient/family education, neuromuscular re-education, ROM (range of motion), stair training, strengthening  Plan of Care Reviewed With: patient  Outcome Evaluation: Pt is a 63 y/o F POD0 L TKA. Pt lives alone, but will have a friend staying with her at D/C. Pt has 0 BRANDON and lives in a single story home, has rollator at home but would benefit from RW. Pt agreeable to work with PT, SBA for bed mobility and CGA for STS and to ambulate 45'. Pt demo's step to gait pattern and cues for proper gait mechanics given during session. Pt demo's dec'd strength, endurance, and balance and would benefit from skilled PT intervention. Rec D/C home with 24/7 assist and HHPT.     Time Calculation:    PT Charges     Row Name 01/10/23 1523             Time Calculation    Start Time 1500  -DB      Stop Time 1513  -DB      Time Calculation (min) 13 min  -DB      PT Received On 01/10/23  -DB      PT - Next Appointment 01/11/23  -DB      PT Goal Re-Cert Due Date 01/17/23  -DB         Time Calculation- PT    Total Timed Code Minutes- PT 8 minute(s)  -DB            User Key  (r) = Recorded By, (t) = Taken By, (c) = Cosigned By    Initials Name Provider Type    DB Jacquelyn Kenney PT Physical Therapist              Therapy Charges for Today     Code Description Service Date Service Provider Modifiers Qty    00462416760 HC PT EVAL MOD COMPLEXITY 2 1/10/2023 Jacquelyn Kenney, PT GP 1    [id number] HC PT THERAPEUTIC ACT EA 15 MIN 1/10/2023 Jacquelyn Kenney, PT GP 1          PT G-Codes  Outcome Measure Options: AM-PAC 6 Clicks Basic Mobility (PT)  AM-PAC 6 Clicks Score (PT): 19  PT Discharge Summary  Anticipated Discharge  Disposition (PT): home with 24/7 care, home with home health    Jacquelyn Kenney, PT  1/10/2023

## 2023-01-10 NOTE — OP NOTE
Operative Note    Name: Es Hdez  YOB: 1960  MRN: [id number]  BMI: There is no height or weight on file to calculate BMI.    DATE OF SURGERY: 1/10/2023    PREOPERATIVE DIAGNOSIS: left knee end-stage osteoarthritis    POSTOPERATIVE DIAGNOSIS: left knee end-stage osteoarthritis    PROCEDURE PERFORMED: left total knee replacement with [place] navigation    SURGEON: Dr. Nicanor Benavides    ASSISTANT: NELLI Montoya    IMPLANTS: DepuyAttune    Estimated Blood Loss: 100 mL  Specimens : none  Complications: none  22 Modifier:  none    DESCRIPTION OF PROCEDURE: The patient was taken to the operating room and placed in the supine position. Preoperative antibiotics were administered. Surgical time out was performed. After adequate induction of anesthesia, the leg was prepped and draped in the usual sterile fashion. The leg was exsanguinated with an Esmarch bandage and the tourniquet inflated to 250 mmHg. A midline incision was performed followed by a medial parapatellar arthrotomy. The patella was subluxed laterally.  A portion of the fat pad, ACL, and anterior horns of the meniscus were excised.  The [employer] navigation device was affixed and navigated. The distal cut was made. The femur was then sized with a sizing guide. The femoral cutting block was placed and all femoral cuts were performed. The proximal tibia was exposed. Katalina navigation protocol was accomplished.  9 millimeters were removed.  We used the extramedullary tibial cutting guide set for removal of 3 mm of bone off the low side. The tibial cut was performed. The posterior horns of the menisci were excised. The posterior osteophytes were removed. Flexion extension blocks were then used to balance the knee. The tibial cut surface was then sized with the sizing templates and the tibial and femoral trial were then placed. The tray was aligned with the middle third of the tubercle.    Attention was then placed to the patella. The  patella was balanced to track centrally through range of motion.  It measured 23 and patella resurfacing was performed.   At this point all trial components were removed, the knee was copiously irrigated with pulsed lavage, and the knee was injected with anesthetic cocktail solution. The cut surfaces were then dried with clean lap sponges, and the components were cemented, first the tibia, followed by femur. The knee was held in full extension and all excess cement was removed. The knee was held still until the cement had completely hardened. We then placed the trial polyethylene spacer which resulted in full extension and excellent flexion-extension balance. We placed the final polyethylene spacer.   The knee was then copiously irrigated with the full 3 liters. The tourniquet was then released. There was excellent hemostasis. We closed the knee in multiple layers in standard fashion.  A sterile dressing was applied. At the end of the case, the sponge and needle counts were reported as being correct. There were no known complications. The patient was then transported to the recovery room.    The surgical assistant performed retraction, suction, hemostasis, and specific limb positioning and manipulation required for accuracy of joint placement, and assistance in wound closure and dressing application.       Nicanor Benavides M.D.

## 2023-01-11 VITALS
DIASTOLIC BLOOD PRESSURE: 73 MMHG | WEIGHT: 120 LBS | TEMPERATURE: 98.4 F | SYSTOLIC BLOOD PRESSURE: 159 MMHG | HEART RATE: 96 BPM | HEIGHT: 59 IN | RESPIRATION RATE: 16 BRPM | BODY MASS INDEX: 24.19 KG/M2 | OXYGEN SATURATION: 99 %

## 2023-01-11 LAB
HCT VFR BLD AUTO: 32.5 % (ref 34–46.6)
HGB BLD-MCNC: 11.6 G/DL (ref 12–15.9)

## 2023-01-11 PROCEDURE — G0378 HOSPITAL OBSERVATION PER HR: HCPCS

## 2023-01-11 PROCEDURE — 85018 HEMOGLOBIN: CPT | Performed by: NURSE PRACTITIONER

## 2023-01-11 PROCEDURE — 99238 HOSP IP/OBS DSCHRG MGMT 30/<: CPT | Performed by: NURSE PRACTITIONER

## 2023-01-11 PROCEDURE — 85014 HEMATOCRIT: CPT | Performed by: NURSE PRACTITIONER

## 2023-01-11 PROCEDURE — 97530 THERAPEUTIC ACTIVITIES: CPT

## 2023-01-11 RX ORDER — PSEUDOEPHEDRINE HCL 30 MG
100 TABLET ORAL 2 TIMES DAILY
Qty: 60 CAPSULE | Refills: 0 | Status: SHIPPED | OUTPATIENT
Start: 2023-01-11

## 2023-01-11 RX ORDER — POLYETHYLENE GLYCOL 3350 17 G/17G
17 POWDER, FOR SOLUTION ORAL DAILY
Qty: 238 G | Refills: 0 | Status: SHIPPED | OUTPATIENT
Start: 2023-01-11 | End: 2023-01-25

## 2023-01-11 RX ORDER — OXYCODONE HYDROCHLORIDE AND ACETAMINOPHEN 5; 325 MG/1; MG/1
TABLET ORAL
Qty: 42 TABLET | Refills: 0 | Status: SHIPPED | OUTPATIENT
Start: 2023-01-11

## 2023-01-11 RX ORDER — ONDANSETRON 4 MG/1
4 TABLET, FILM COATED ORAL EVERY 6 HOURS PRN
Qty: 10 TABLET | Refills: 0 | Status: SHIPPED | OUTPATIENT
Start: 2023-01-11

## 2023-01-11 RX ADMIN — OXYCODONE AND ACETAMINOPHEN 2 TABLET: 5; 325 TABLET ORAL at 04:41

## 2023-01-11 RX ADMIN — OXYCODONE AND ACETAMINOPHEN 2 TABLET: 5; 325 TABLET ORAL at 09:52

## 2023-01-11 RX ADMIN — GABAPENTIN 800 MG: 400 CAPSULE ORAL at 09:52

## 2023-01-11 RX ADMIN — OXYBUTYNIN CHLORIDE 10 MG: 10 TABLET, EXTENDED RELEASE ORAL at 09:54

## 2023-01-11 RX ADMIN — METOPROLOL TARTRATE 75 MG: 25 TABLET, FILM COATED ORAL at 09:52

## 2023-01-11 RX ADMIN — DOCUSATE SODIUM 100 MG: 100 CAPSULE, LIQUID FILLED ORAL at 09:53

## 2023-01-11 RX ADMIN — ACYCLOVIR 400 MG: 200 CAPSULE ORAL at 09:54

## 2023-01-11 RX ADMIN — PANTOPRAZOLE SODIUM 40 MG: 40 TABLET, DELAYED RELEASE ORAL at 05:57

## 2023-01-11 RX ADMIN — POLYETHYLENE GLYCOL 3350 17 G: 17 POWDER, FOR SOLUTION ORAL at 09:54

## 2023-01-11 RX ADMIN — FOLIC ACID 2000 MCG: 1 TABLET ORAL at 09:53

## 2023-01-11 RX ADMIN — APIXABAN 2.5 MG: 2.5 TABLET, FILM COATED ORAL at 09:53

## 2023-01-11 RX ADMIN — AMLODIPINE BESYLATE 10 MG: 10 TABLET ORAL at 09:54

## 2023-01-11 NOTE — THERAPY TREATMENT NOTE
Patient Name: Es Hdez  : 1960    MRN: [id number]                              Today's Date: 2023       Admit Date: 1/10/2023    Visit Dx:     ICD-10-CM ICD-9-CM   1. S/P TKR (total knee replacement), left  Z96.652 V43.65   2. Arthritis of left knee  M17.12 716.96     Patient Active Problem List   Diagnosis   • Arthritis of left knee     Past Medical History:   Diagnosis Date   • Acromioclavicular separation    • Arthritis of back    • Arthritis of neck    • Bladder spasms    • Cervical disc disorder    • CTS (carpal tunnel syndrome) ?   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   • Tennis elbow    • Thoracic disc disorder    • Wrist sprain 1090     Past Surgical History:   Procedure Laterality Date   • BACK SURGERY     • ELBOW PROCEDURE     • HAND SURGERY     • HYSTERECTOMY     • KNEE SURGERY     • NECK SURGERY     • SHOULDER SURGERY     • TOTAL KNEE ARTHROPLASTY Left 1/10/2023    Procedure: LEFT TOTAL KNEE ARTHROPLASTY WITH MIGUELANGEL NAVIGATION;  Surgeon: Nicanor Benavides MD;  Location: [facility];  Service: Orthopedics;  Laterality: Left;   • TRIGGER POINT INJECTION     • WRIST SURGERY        General Information     Row Name 23 1035           Physical Therapy Time and Intention    Document Type therapy note (daily note)  -DB     Mode of Treatment individual therapy;physical therapy  -DB     Row Name 01/11/23 1035          General Information    Patient Profile Reviewed yes  -DB           User Key  (r) = Recorded By, (t) = Taken By, (c) = Cosigned By    Initials Name Provider Type    DB Jacquelyn Kenney PT Physical Therapist               Mobility     Row Name 01/11/23 1035          Bed Mobility    Bed Mobility supine-sit  -DB     Supine-Sit [place] (Bed Mobility) supervision  -DB     Assistive Device (Bed Mobility) bed rails;head of bed elevated  -DB     Row Name 01/11/23 1035          Sit-Stand Transfer    Sit-Stand [place] (Transfers) contact guard  -DB     Assistive Device (Sit-Stand Transfers) walker, front-wheeled  -DB     Row Name 01/11/23 1035          Gait/Stairs (Locomotion)    [place] Level (Gait) contact guard;verbal cues  -DB     Assistive Device (Gait) walker, front-wheeled  -DB     Distance in Feet (Gait) 100'  -DB     Deviations/Abnormal Patterns (Gait) stride length decreased;antalgic;gait speed decreased  -DB     Bilateral Gait Deviations heel strike decreased  -DB           User Key  (r) = Recorded By, (t) = Taken By, (c) = Cosigned By    Initials Name Provider Type    DB Jacquelyn Kenney PT Physical Therapist               Obj/Interventions     Row Name 01/11/23 1036          Motor Skills    Therapeutic Exercise other (see comments)  seated MIP, LAQ, AP x 10 BLE  -DB     Row Name 01/11/23 1036          Balance    Balance Assessment sitting static balance;sitting dynamic balance;standing static balance;standing dynamic balance  -DB     Static Sitting Balance supervision  -DB     Dynamic Sitting Balance supervision  -DB     Position, Sitting Balance unsupported;sitting edge of bed  -DB     Static Standing Balance standby assist  -DB     Dynamic Standing Balance contact guard  -DB     Position/Device Used, Standing  Balance supported;walker, front-wheeled  -DB     Balance Interventions sitting;standing;sit to stand  -DB           User Key  (r) = Recorded By, (t) = Taken By, (c) = Cosigned By    Initials Name Provider Type    Jacquelyn Syed PT Physical Therapist               Goals/Plan    No documentation.                Clinical Impression     Row Name 01/11/23 1036          Pain    Pain Intervention(s) Ambulation/increased activity;Repositioned  -DB     Row Name 01/11/23 1036          Plan of Care Review    Plan of Care Reviewed With patient  -DB     Progress improving  -DB     Outcome Evaluation Pt is POD1 L TKA. Supine in bed at start of session and agreeable to work with PT. Pt able to perform bed mobility with SV and ambulate 100' with RW CGA/SBA. Pt seated UIC at end of the session. Denies needing to practice steps as she has 0 BRANDON. Continues to benefit from skilled PT.  -DB     Row Name 01/11/23 1036          Vital Signs    O2 Delivery Pre Treatment room air  -DB     O2 Delivery Intra Treatment room air  -DB     O2 Delivery Post Treatment room air  -DB     Pre Patient Position Supine  -DB     Intra Patient Position Standing  -DB     Post Patient Position Sitting  -DB     Row Name 01/11/23 1036          Positioning and Restraints    Pre-Treatment Position in bed  -DB     Post Treatment Position chair  -DB     In Chair reclined;sitting;call light within reach;encouraged to call for assist;exit alarm on  -DB           User Key  (r) = Recorded By, (t) = Taken By, (c) = Cosigned By    Initials Name Provider Type    Jacquelyn Syed PT Physical Therapist               Outcome Measures     Row Name 01/11/23 1038 01/11/23 1000       How much help from another person do you currently need...    Turning from your back to your side while in flat bed without using bedrails? 4  -DB 4  -SH    Moving from lying on back to sitting on the side of a flat bed without bedrails? 4  -DB 4  -SH    Moving to and from a bed to a chair  (including a wheelchair)? 3  -DB 3  -SH    Standing up from a chair using your arms (e.g., wheelchair, bedside chair)? 3  -DB 3  -SH    Climbing 3-5 steps with a railing? 3  -DB 2  -SH    To walk in hospital room? 3  -DB 3  -SH    AM-PAC 6 Clicks Score (PT) 20  -DB 19  -SH    Highest level of mobility 6 --> Walked 10 steps or more  -DB 6 --> Walked 10 steps or more  -    Row Name 01/11/23 1038          Functional Assessment    Outcome Measure Options AM-PAC 6 Clicks Basic Mobility (PT)  -DB           User Key  (r) = Recorded By, (t) = Taken By, (c) = Cosigned By    Initials Name Provider Type    DB Jacquelyn Kenney, JONATHAN Physical Therapist    Denisha Diaz RN Registered Nurse                             Physical Therapy Education     Title: PT OT SLP Therapies (Done)     Topic: Physical Therapy (Done)     Point: Mobility training (Done)     Learning Progress Summary           Patient Acceptance, E, VU by DB at 1/11/2023 1038    Acceptance, E, VU by DB at 1/10/2023 1523                   Point: Home exercise program (Done)     Learning Progress Summary           Patient Acceptance, E, VU by DB at 1/11/2023 1038    Acceptance, E, VU by DB at 1/10/2023 1523                   Point: Body mechanics (Done)     Learning Progress Summary           Patient Acceptance, E, VU by DB at 1/11/2023 1038    Acceptance, E, VU by DB at 1/10/2023 1523                   Point: Precautions (Done)     Learning Progress Summary           Patient Acceptance, E, VU by DB at 1/11/2023 1038    Acceptance, E, VU by DB at 1/10/2023 1523                               User Key     Initials Effective Dates Name Provider Type Discipline    DB 06/16/21 -  Jacquelyn Kenney, PT Physical Therapist PT              PT Recommendation and Plan  Planned Therapy Interventions (PT): balance training, bed mobility training, gait training, transfer training, home exercise program, patient/family education, neuromuscular re-education, ROM (range of  motion), stair training, strengthening  Plan of Care Reviewed With: patient  Progress: improving  Outcome Evaluation: Pt is POD1 L TKA. Supine in bed at start of session and agreeable to work with PT. Pt able to perform bed mobility with SV and ambulate 100' with RW CGA/SBA. Pt seated UIC at end of the session. Denies needing to practice steps as she has 0 BRANDON. Continues to benefit from skilled PT.     Time Calculation:    PT Charges     Row Name 01/11/23 1039             Time Calculation    Start Time 0933  -DB      Stop Time 0946  -DB      Time Calculation (min) 13 min  -DB      PT Received On 01/11/23  -DB      PT - Next Appointment 01/12/23  -DB         Time Calculation- PT    Total Timed Code Minutes- PT 13 minute(s)  -DB            User Key  (r) = Recorded By, (t) = Taken By, (c) = Cosigned By    Initials Name Provider Type    DB Jacquelyn Kenney, PT Physical Therapist              Therapy Charges for Today     Code Description Service Date Service Provider Modifiers Qty    74086623231 HC PT EVAL MOD COMPLEXITY 2 1/10/2023 Jacquelyn Kenney, PT GP 1    91734142458  PT THERAPEUTIC ACT EA 15 MIN 1/10/2023 Jacquelyn Kenney, PT GP 1    44416375312 HC PT THERAPEUTIC ACT EA 15 MIN 1/11/2023 Jacquelyn Kenney, PT GP 1          PT G-Codes  Outcome Measure Options: AM-PAC 6 Clicks Basic Mobility (PT)  AM-PAC 6 Clicks Score (PT): 20  PT Discharge Summary  Anticipated Discharge Disposition (PT): home with 24/7 care, home with home health    Jacquelyn Kenney PT  1/11/2023

## 2023-01-11 NOTE — PLAN OF CARE
Goal Outcome Evaluation:  Plan of Care Reviewed With: patient        Progress: improving  Outcome Evaluation: Pt is POD1 L TKA. Supine in bed at start of session and agreeable to work with PT. Pt able to perform bed mobility with SV and ambulate 100' with RW CGA/SBA. Pt seated UIC at end of the session. Denies needing to practice steps as she has 0 BRANDON. Continues to benefit from skilled PT.

## 2023-01-11 NOTE — PLAN OF CARE
Goal Outcome Evaluation:  Plan of Care Reviewed With: patient            Pt goals met for discharge.

## 2023-01-11 NOTE — DISCHARGE PLACEMENT REQUEST
"Alex Hdez (62 y.o. Female)     Date of Birth   1960    Social Security Number       Address   [address] [place] [address]    Home Phone   [phone]    MRN   [id number]       Rastafarian   None    Marital Status   Single                            Admission Date   1/10/23    Admission Type   Elective    Admitting Provider   Nicanor Benavides MD    Attending Provider   Nicanor Benavides MD    Department, Room/Bed   [address], P789/1       Discharge Date       Discharge Disposition   Home or Self Care    Discharge Destination                               Attending Provider: Nicanor Benavides MD    Allergies: Aspirin, Iron, Penicillins, Sm Non-asprin Sinus [Pseudoephedrine-acetaminophen], Nsaids, Toradol [Ketorolac Tromethamine]    Isolation: None   Infection: None   Code Status: CPR    Ht: 149.9 cm (59\")   Wt: 54.4 kg (120 lb)    Admission Cmt: None   Principal Problem: Arthritis of left knee [M17.12]                 Active Insurance as of 1/10/2023     Primary Coverage     Payor Plan Insurance Group Employer/Plan Group    [employer] [facility] DEPT [id number]      Coverage Address Coverage Phone Number Coverage Fax Number Effective Dates    [address] [place] [phone]  10/6/2022 - None Entered    [place] [address]       Subscriber Name Subscriber Birth Date Member ID       ALEX HDEZ 1960 [id number]                 Emergency Contacts      (Rel.) Home Phone Work Phone Mobile Phone    PAUL DRUMMOND (Friend) [phone] -- --            "

## 2023-01-11 NOTE — DISCHARGE SUMMARY
Orthopedic Discharge Summary      Patient: Es Hdez  YOB: 1960  Medical Record Number: [id number]    Attending Physician: Nicanor Benavides MD  Consulting Physician(s):   Consults     No orders found for last 30 day(s).          Date of Admission: 1/10/2023  6:41 AM  Date of Discharge: 1/11/2023    Discharge Diagnosis: [place] ARTHRP KNE CONDYLE&PLATU MEDIAL&LAT COMPARTMENTS [26349] (LEFT TOTAL KNEE ARTHROPLASTY WITH MIGUELANGEL NAVIGATION),   Acute Blood Loss Anemia, stable  Post-operative Pain  Limited mobility, requires use of walker and assistance when OOB.    Presenting Problem/History of Present Illness: Arthritis of left knee [M17.12]    Allergies:   Allergies   Allergen Reactions   • Aspirin Shortness Of Breath   • Iron Anaphylaxis     infusions   • Penicillins Anaphylaxis   • Sm Non-Asprin Sinus [Pseudoephedrine-Acetaminophen] Anaphylaxis   • Nsaids Other (See Comments)     HISTORY OF GI BLEED   • Toradol [Ketorolac Tromethamine] Swelling       Discharge Medications       Discharge Medications      New Medications      Instructions Start Date   apixaban 2.5 MG tablet tablet  Commonly known as: ELIQUIS   Take 1 tablet by mouth Every 12 (Twelve) Hours.      docusate sodium 100 MG capsule   100 mg, Oral, 2 Times Daily      ondansetron 4 MG tablet  Commonly known as: ZOFRAN   4 mg, Oral, Every 6 Hours PRN      oxyCODONE-acetaminophen 5-325 MG per tablet  Commonly known as: PERCOCET   Take 1-2 tablets by mouth every 6 hours as needed for severe pain.      polyethylene glycol 17 GM/SCOOP powder  Commonly known as: MIRALAX   Mix one capful (17gm) in liquid and take by mouth Daily for 10 doses.         Continue These Medications      Instructions Start Date   acyclovir 200 MG capsule  Commonly known as: ZOVIRAX   400 mg, Oral, Daily      amLODIPine 10 MG tablet  Commonly known as: NORVASC   10 mg, Oral, Daily      darifenacin 15 MG 24 hr tablet  Commonly known as: ENABLEX   15 mg, Oral, Daily       doxepin 75 MG capsule  Commonly known as: SINEquan   75 mg, Oral, Nightly      fexofenadine 180 MG tablet  Commonly known as: ALLEGRA   180 mg, Oral, Daily      folic acid 1 MG tablet  Commonly known as: FOLVITE   2 tablets, Oral, Every Morning      gabapentin 400 MG capsule  Commonly known as: NEURONTIN   800 mg, Oral, 4 Times Daily      Lactobacillus tablet   2 tablets, Oral, Daily      lidocaine 5 %  Commonly known as: LIDODERM   1 patch, Transdermal, Every 24 Hours      metoprolol tartrate 50 MG tablet  Commonly known as: LOPRESSOR   75 mg, Oral, 2 Times Daily      montelukast 10 MG tablet  Commonly known as: SINGULAIR   10 mg, Oral, Daily      naloxone 4 MG/0.1ML nasal spray  Commonly known as: NARCAN   USE 4 MG (ONE SPRAY) IN ONE NOSTRIL TIMES ONE DOSE - REPEAT WITH SECOND DEVICE INTO OTHER NOSTRIL AFTER 2-3 MINUTES IF NO OR MINIMAL RESPONSE.      pantoprazole 40 MG pack packet  Commonly known as: PROTONIX   40 mg, Oral, 2 Times Daily      Polyethylene Glycol 3350 powder   Does not apply, As Needed         Stop These Medications    Calcium Polycarbophil 625 MG chewable tablet     Cholecalciferol 50 MCG (2000 UT) capsule     HYDROcodone-acetaminophen  MG per tablet  Commonly known as: NORCO     Morphine 30 MG tablet  Commonly known as: MSIR     promethazine 25 MG tablet  Commonly known as: PHENERGAN              Past Medical History:   Diagnosis Date   • Acromioclavicular separation 1986   • Arthritis of back 1987   • Arthritis of neck 1989   • Bladder spasms    • Cervical disc disorder 1989   • CTS (carpal tunnel syndrome) ?   • Dislocated elbow 1985   • Dislocation of finger 1977   • Dislocation, shoulder 1986   • Fracture of wrist 1977   • Fracture, clavicle 1985   • Fracture, finger 1980   • Frozen shoulder 1989   • GERD (gastroesophageal reflux disease)    • History of GI bleed    • History of left bundle branch block (LBBB)     EKGs from  [place] 1/2022 9/2020, [facility]  [facility], request for records sent   • Knee sprain    • Knee swelling    • Low back strain    • Lumbosacral disc disease    • Neck strain    • Partial deafness of right ear    • Periarthritis of shoulder    • Rotator cuff syndrome    • Scoliosis    • Slow gastric motility    • Stress fracture    • Tear of meniscus of knee    • Tendinitis of knee ?   • Tennis elbow    • Thoracic disc disorder    • Wrist sprain 1090        Past Surgical History:   Procedure Laterality Date   • BACK SURGERY     • ELBOW PROCEDURE     • HAND SURGERY     • HYSTERECTOMY     • KNEE SURGERY     • NECK SURGERY     • SHOULDER SURGERY     • TOTAL KNEE ARTHROPLASTY Left 1/10/2023    Procedure: LEFT TOTAL KNEE ARTHROPLASTY WITH MIGUELANGEL NAVIGATION;  Surgeon: Nicanor Benavides MD;  Location: [facility];  Service: Orthopedics;  Laterality: Left;   • TRIGGER POINT INJECTION     • WRIST SURGERY          Social History     Occupational History   • Not on file   Tobacco Use   • Smoking status: Every Day     Packs/day: 0.50     Years: 48.00     Pack years: 24.00     Types: Cigarettes, Electronic Cigarette     Start date: 10/1/1972     Last attempt to quit: 10/14/2022     Years since quittin.2   • Smokeless tobacco: Former   Vaping Use   • Vaping Use: Every day   • Substances: Nicotine   Substance and Sexual Activity   • Alcohol use: Never   • Drug use: Never   • Sexual activity: Not Currently     Partners: Female     Birth control/protection: None, Hysterectomy      Social History     Social History Narrative   • Not on file        Family History   Problem Relation Age of Onset   • Rheumatologic disease Mother    • Scoliosis Mother    • Rheumatologic disease Sister    • Scoliosis Sister    • Severe sprains Brother    • Broken bones Maternal Aunt    • Rheumatologic disease Maternal Aunt    • Dislocations Maternal Aunt    • Scoliosis Maternal Uncle    • Diabetes Paternal Grandmother    • Cancer  Paternal Grandfather    • Malig Hyperthermia Neg Hx          Physical Exam: 62 y.o. female   Body mass index is 24.24 kg/m².  Facility age limit for growth percentiles is 20 years.  Vitals:    01/11/23 0606   BP: 153/79   Pulse: 88   Resp: 16   Temp: 99.1 °F (37.3 °C)   SpO2: 96%         General Appearance:    Alert, cooperative, in no acute distress                      Vitals:    01/10/23 1700 01/10/23 2213 01/11/23 0149 01/11/23 0606   BP: 130/74 149/75 151/82 153/79   BP Location: Right arm Right arm Left arm Left arm   Patient Position: Lying Lying Lying Lying   Pulse: 93 90 76 88   Resp: 16 16 16 16   Temp: 97.1 °F (36.2 °C) 97.8 °F (36.6 °C) 97 °F (36.1 °C) 99.1 °F (37.3 °C)   TempSrc: Oral Oral Oral Oral   SpO2: 100% 98% 98% 96%   Weight:       Height:            DIAGNOSTIC TESTS:   Admission on 01/10/2023   Component Date Value Ref Range Status   • Hemoglobin 01/11/2023 11.6 (L)  12.0 - 15.9 g/dL Final   • Hematocrit 01/11/2023 32.5 (L)  34.0 - 46.6 % Final       Imaging Results (Last 72 Hours)     Procedure Component Value Units Date/Time    XR Knee 1 or 2 View Left [[id number]] Collected: 01/10/23 1139     Updated: 01/10/23 1143    Narrative:      XR KNEE 1 OR 2 VW LEFT-  01/10/2023     HISTORY: Left knee arthroplasty.     Distal femoral and proximal tibia components of the left knee prosthesis  are well-seated with no abnormal surrounding bony lucencies. Soft tissue  air is seen.     No unexpected findings are noted.       Impression:      1. Satisfactory postoperative appearance of the left knee.     This report was finalized on 1/10/2023 11:40 AM by Dr. Obinna Serra M.D.             Hospital Course:  62 y.o. female admitted to [facility] to services of Nicanor Benavides MD with Arthritis of left knee [M17.12] on 1/10/2023 and underwent [place] ARTHRP KNE CONDYLE&PLATU MEDIAL&LAT COMPARTMENTS [36849] (LEFT TOTAL KNEE ARTHROPLASTY WITH MIGUELANGEL NAVIGATION)  Per Nicanor Benavides MD. Antibiotic and VTE  prophylaxis were per SCIP protocols. Post-operatively the patient transferred to the post-operative floor where the patient underwent mobilization therapy that included active as well as passive ROM exercises. Opioids were titrated to achieve appropriate pain management to allow for participation in mobilization exercises. Vital signs are now stable. On the day of discharge the wound was clean, dry and intact and calf was soft and nontender and Homans sign was negative. Operative extremity neurovascular status remains intact.   Appropriate education re: incision care, activity levels, medications, and follow up visits was completed and all questions were answered. The patient is now deemed stable for discharge.    Condition on Discharge:  Stable    Total Joint Replacement Discharge Instructions: Patient is to continue with physical therapy exercises twice daily and continue working with the physical therapist as ordered  and should be up walking every 2 hours during the day in addition to the physical therapy exercises. Patient may weight bear as tolerated unless otherwise specified. Continue to ice regularly. Do frequent ankle pumping exercises while you are sitting with legs elevated.  Patient also instructed on deep breathing, coughing, and using  incentive spirometer during hospitalization and encouraged to continue to use at home regularly.        VI. FOLLOW-UP VISITS:  ? Follow up in the office with Dr Nicanor Benavides in 2 weeks - If already scheduled (see Future Appointments) for date and time, if not yet scheduled, patient to call the office at [phone] to schedule. Prescriptions were given for pain medication, nausea, constipation, and blood thinner therapy.    ? If you have any concerns or suspected complications prior to your follow up visit, please call your surgeon's office. Do not wait until your appointment time if you suspect complications. These will need to be addressed in the office  promptly.      Future Appointments   Date Time Provider Department Center   1/26/2023  3:00 PM Kendy Gustafson APRN [facility] [address] [facility]     Additional Instructions for the Follow-ups that You Need to Schedule     Ambulatory Referral to Home Health   As directed      Face to Face Visit Date: 1/11/2023    Follow-up provider for Plan of Care?: I will be treating the patient on an ongoing basis.  Please send me the Plan of Care for signature.    Follow-up provider: NICANOR WATERS [[id number]]    Reason/Clinical Findings: post surgical    Describe mobility limitations that make leaving home difficult: Requires the assistance of another to leave home    Nursing/Therapeutic Services Requested: Physical Therapy    PT orders: Total joint pathway    Frequency: 1 Week 1         Ambulatory Referral to Physical Therapy   As directed      Please schedule for approximately 2 weeks post-op    Order Comments: Please schedule for approximately 2 weeks post-op     Follow-up needed: Yes         Discharge Follow-up with Specialty: Orthopedics; 2 Weeks   As directed      Specialty: Orthopedics    Follow Up: 2 Weeks    Follow Up Details: Return to the office to see Dr. Nicanor Waters               Discharge Disposition Plan:today to home, home health and when cleared by physical therapy as safe for discharge    Date: 1/11/2023    KLEBER Honeycutt    Dictated Utilizing Dragon Dictation

## 2023-01-11 NOTE — PROGRESS NOTES
Orthopedic Total Joint Progress Note        Patient: Es Hdez    Date of Admission: 1/10/2023  6:41 AM    YOB: 1960    Medical Record Number: [id number]    Attending Physician: Nicanor Benavides MD      POD # 1 Day Post-Op Procedure(s) (LRB):  LEFT TOTAL KNEE ARTHROPLASTY WITH MIGUELANGEL NAVIGATION (Left)       Systemic or Specific Complaints: The patient has had a relatively normal postoperative course.  The patient has had no current complaints. The patient has had improving normal postoperative pain.  The patient has had no issues with the wound.  Patient in bed sleeping upon arrival.  She states she is doing okay, her pain is little bit increased throughout the night, they tried to give her 1 pain pill instead of 2.  However this was not during much for her.  She did end up taking 2 of the Percocet prescription.  She knew that pain management was going to be a little difficult due to her chronic pain management she was in prior to surgery.  She denies any dizziness or lightheadedness upon standing.  She has been up to the restroom a few times throughout the night. She has no other complaints at this time.      Allergies:   Allergies   Allergen Reactions   • Aspirin Shortness Of Breath   • Iron Anaphylaxis     infusions   • Penicillins Anaphylaxis   • Sm Non-Asprin Sinus [Pseudoephedrine-Acetaminophen] Anaphylaxis   • Nsaids Other (See Comments)     HISTORY OF GI BLEED   • Toradol [Ketorolac Tromethamine] Swelling       Medications:   Current Medications:  Scheduled Meds:acyclovir, 400 mg, Oral, Daily  amLODIPine, 10 mg, Oral, Daily  apixaban, 2.5 mg, Oral, Q12H  docusate sodium, 100 mg, Oral, BID  doxepin, 75 mg, Oral, Nightly  folic acid, 2,000 mcg, Oral, QAM  gabapentin, 800 mg, Oral, 4x Daily  metoprolol tartrate, 75 mg, Oral, BID  montelukast, 10 mg, Oral, Nightly  oxybutynin XL, 10 mg, Oral, Daily  pantoprazole, 40 mg, Oral, BID AC  polyethylene glycol, 17 g, Oral, Daily      Continuous  "Infusions:lactated ringers, 9 mL/hr, Last Rate: Stopped (01/10/23 1327)  lactated ringers, 100 mL/hr, Last Rate: 100 mL/hr (01/10/23 1725)      PRN Meds:.•  acetaminophen  •  bisacodyl  •  bisacodyl  •  HYDROmorphone **AND** naloxone  •  ondansetron **OR** ondansetron  •  oxyCODONE-acetaminophen  •  oxyCODONE-acetaminophen      Physical Exam: 62 y.o. female   Wt Readings from Last 3 Encounters:   01/10/23 54.4 kg (120 lb)   01/09/23 50.8 kg (112 lb)   12/28/22 50.8 kg (112 lb)     Ht Readings from Last 3 Encounters:   01/10/23 149.9 cm (59\")   01/09/23 149.9 cm (59\")   12/28/22 149.9 cm (59\")     Body mass index is 24.24 kg/m².    Vitals:    01/10/23 1700 01/10/23 2213 01/11/23 0149 01/11/23 0606   BP: 130/74 149/75 151/82 153/79   BP Location: Right arm Right arm Left arm Left arm   Patient Position: Lying Lying Lying Lying   Pulse: 93 90 76 88   Resp: 16 16 16 16   Temp: 97.1 °F (36.2 °C) 97.8 °F (36.6 °C) 97 °F (36.1 °C) 99.1 °F (37.3 °C)   TempSrc: Oral Oral Oral Oral   SpO2: 100% 98% 98% 96%   Weight:       Height:            General Appearance:    General: alert and oriented         Abdomen/:     soft non-tender, non-distended, voiding without difficulty       Extremities:   Operative extremity neurovascular status intact. ROM appropriate.  Incision intact w/out signs or symptoms of infection.  No cyanosis, calf is soft and nontender.  Ace bandage and cast padding removed.  Optifoam dressing clean dry and intact, small pencil racer sized area of dried bloody drainage in the middle of the dressing, no signs of drainage or active bleeding.     Activity: Mobilizing Per P.T.   Weight Bearing: As Tolerated    Diagnostic Tests:   Admission on 01/10/2023   Component Date Value Ref Range Status   • Hemoglobin 01/11/2023 11.6 (L)  12.0 - 15.9 g/dL Final   • Hematocrit 01/11/2023 32.5 (L)  34.0 - 46.6 % Final       Imaging Results (Last 72 Hours)     Procedure Component Value Units Date/Time    XR Knee 1 or 2 View " Left [[id number]] Collected: 01/10/23 1139     Updated: 01/10/23 1143    Narrative:      XR KNEE 1 OR 2 VW LEFT-  01/10/2023     HISTORY: Left knee arthroplasty.     Distal femoral and proximal tibia components of the left knee prosthesis  are well-seated with no abnormal surrounding bony lucencies. Soft tissue  air is seen.     No unexpected findings are noted.       Impression:      1. Satisfactory postoperative appearance of the left knee.     This report was finalized on 1/10/2023 11:40 AM by Dr. Obinna Serra M.D.             Personally viewed ortho images and report     Assessment:  - Doing well 1 Day Post-Op following total joint replacement  - Acute Blood Loss Anemia, preoperative hemoglobin 12.0, postoperative hemoglobin 11.6 - stable  - Post-operative Pain  - Limited mobility, requires use of walker and assistance when OOB.  Patient utilized travel cart and arm canes prior to surgery    Patient Active Problem List   Diagnosis   • Arthritis of left knee        Plan:    - Consults: none  - Continue to monitor labs and/or v/s, for tolerance to post op blood loss.  - Continue efforts to increase mobilization.  - Continue Pain Control Measures.  - Continue incisional Care.  - DVT prophylaxis - Eliquis (aspirin allergy)  - Follow up in office with Nicanor Benavides M.D. In 2 weeks.    Discharge Plan:today to home, home health and when cleared by physical therapy as safe for discharge    Date: 1/11/2023  KLEBER Honeycutt

## 2023-01-11 NOTE — PROGRESS NOTES
Continued Stay Note  [facility]     Patient Name: Es Hdez  MRN: [id number]  Today's Date: 1/11/2023    Admit Date: 1/10/2023    Plan: [facility]   Discharge Plan     Row Name 01/11/23 1607       Plan    Plan [facility]    Patient/Family in Agreement with Plan yes    Plan Comments Spoke with pt, verified correct information on facesheet and explained the role of CCP. Pt has [facility], discussed options for HH. Pt agreeable to d/c home with [facility] who [facility]. Referral sent in [facility] and notified [facility]. Pt has been accepted. Plan is home with  anf family support.    Final Discharge Disposition Code 06 - home with home health care    Final Note [facility]               Discharge Codes    No documentation.               Expected Discharge Date and Time     Expected Discharge Date Expected Discharge Time    Jan 11, 2023             Yari Goodman RN

## 2023-01-26 ENCOUNTER — OFFICE VISIT (OUTPATIENT)
Dept: ORTHOPEDIC SURGERY | Facility: CLINIC | Age: 63
End: 2023-01-26
Payer: OTHER GOVERNMENT

## 2023-01-26 VITALS — BODY MASS INDEX: 24.19 KG/M2 | HEIGHT: 59 IN | WEIGHT: 120 LBS | TEMPERATURE: 96.8 F

## 2023-01-26 DIAGNOSIS — Z96.652 S/P TKR (TOTAL KNEE REPLACEMENT), LEFT: Primary | ICD-10-CM

## 2023-01-26 PROCEDURE — 73560 X-RAY EXAM OF KNEE 1 OR 2: CPT | Performed by: NURSE PRACTITIONER

## 2023-01-26 PROCEDURE — 99024 POSTOP FOLLOW-UP VISIT: CPT | Performed by: NURSE PRACTITIONER

## 2023-01-26 NOTE — PROGRESS NOTES
Es Hdez : 1960 MRN: [id number] DATE: 2023  Body mass index is 24.24 kg/m².  Vitals:    23 1515   Temp: 96.8 °F (36 °C)       DIAGNOSIS: 2 week follow up left total knee arthroplasty    SUBJECTIVE:Patient returns today for 2 week follow up of left total knee replacement.  Patient presents in her motorized scooter with walker to appointment.  She states she is doing very well.  However there have been delays with [facility] care, she just started home physical therapy this week.  However she is doing very well therapist is happy with her progress without therapy she has been bending about 90 degrees on her own.  She does say her leg has been itchy because she does get irritation to adhesives but she has been tolerating it very well.  She has switched back to her morphine and [facility]codone [facility].  She does not need pain medication from us anymore. She has no complaints at this time.    OBJECTIVE:   Exam:. The incision is healing appropriately. No sign of infection. Range of motion is progressing as expected, ROM approximately 90 degrees of flexion, and approximately 10 degrees short of full extension. The calf is soft and nontender with a negative Homans sign.    DIAGNOSTIC STUDIES  2V AP&Lat of the left knee were done in the office today  Indication, findings and comparison: images were reviewed for evaluation of recent knee replacement. They demonstrate a well positioned, well aligned knee replacement without complicating factors noted. In comparison with previous films there has been interval implant placement.    ASSESSMENT: 2 week status post left knee replacement healing as expected, patient's wound does appear to be a little moist.  She is a smoker.  She knows that she is sometimes slow to heal things.  However the edges of the incision healed well approximated there is no openings, active drainage or bleeding noted.  We did place Steri-Strips over the incision as well  I placed nonadherent gauze wrapped with an Ace bandage to hold in place to keep the patient's wound covered as well as to keep skin from irritation of adhesive.  She has multiple dogs at home and we need to keep the wound clean as it heals, this was reiterated to her multiple times.  She verbalized understanding.    PLAN: 1) dressing and exofin fusion removed, steri-strips applied, extras given   2) Order given for PT and pain medicine (as needed)   3) Continue ice PRN   4) Continue Eliquis 2.5mg BID till 4 weeks out from surgery   5) Follow up in 4 weeks with repeat Xrays of left knee (2 views)   6) Wait for 3 months after surgery for routine teeth cleaning and continue with taking a dose of antibiotics before dental procedures for 2 yrs post total joint replacement.    Kendy Gustafson, APRN  1/26/2023      Dictated Utilizing Dragon Dictation

## 2023-02-22 ENCOUNTER — OFFICE VISIT (OUTPATIENT)
Dept: ORTHOPEDIC SURGERY | Facility: CLINIC | Age: 63
End: 2023-02-22
Payer: OTHER GOVERNMENT

## 2023-02-22 VITALS — TEMPERATURE: 97.5 F | WEIGHT: 108.4 LBS | BODY MASS INDEX: 21.85 KG/M2 | HEIGHT: 59 IN

## 2023-02-22 DIAGNOSIS — Z96.652 STATUS POST TOTAL LEFT KNEE REPLACEMENT: ICD-10-CM

## 2023-02-22 DIAGNOSIS — R52 PAIN: Primary | ICD-10-CM

## 2023-02-22 PROCEDURE — 99024 POSTOP FOLLOW-UP VISIT: CPT | Performed by: ORTHOPAEDIC SURGERY

## 2023-02-22 PROCEDURE — 73562 X-RAY EXAM OF KNEE 3: CPT | Performed by: ORTHOPAEDIC SURGERY

## 2023-02-22 NOTE — PROGRESS NOTES
Kimber is seen today and she is about 6 weeks status post left total knee replacement.  She says is doing well for her.  She has 6 dogs in her house.  She said her family doctor put her on Keflex a couple days ago her incision shows an area superiorly in the very central part that appear to be suture granulomas.  Not overly tender to palpation along the joint line or medially and laterally does not have an effusion and has very small amount of drainage out of the suture granuloma holes.  Range of motion goes almost straight back to about 110 and she says she is progressing nicely in her therapy.  X-ray AP lateral sunrise left knee x-ray taken the office today for postop follow-up with comparison view shows a well aligned total knee replacement plan gave her wound care instructions for the suture granuloma she is already on Keflex from her PCP 1 to see in a month for reexam and x-rays unless she has increased amount of pain or drainage and wanted to see us sooner

## 2023-03-22 ENCOUNTER — OFFICE VISIT (OUTPATIENT)
Dept: ORTHOPEDIC SURGERY | Facility: CLINIC | Age: 63
End: 2023-03-22
Payer: OTHER GOVERNMENT

## 2023-03-22 VITALS — HEIGHT: 59 IN | TEMPERATURE: 97.3 F | BODY MASS INDEX: 21.77 KG/M2 | WEIGHT: 108 LBS

## 2023-03-22 DIAGNOSIS — R52 PAIN: Primary | ICD-10-CM

## 2023-03-22 PROCEDURE — 99024 POSTOP FOLLOW-UP VISIT: CPT | Performed by: ORTHOPAEDIC SURGERY

## 2023-03-22 PROCEDURE — 73562 X-RAY EXAM OF KNEE 3: CPT | Performed by: ORTHOPAEDIC SURGERY

## 2023-03-22 NOTE — PROGRESS NOTES
Patient about 2-1/2 months status post her left total knee and she is doing very very well with that.  She rides her motorized scooter to get around.  She is actually doing yard work and inherited the house after her father  she is using the riding mower etc. her knee has no unusual swelling and today she has looks like little suture granuloma and she had some other ones and she is well versed in how to treat them.  There is no effusion and has good range of motion and good stability its not tender and she is glad she had it done worst her pain she says gets to be about 3 out of 10 cautioned her about overdoing it have her continue with a home exercise program she [facility] and I think at this point she is almost 3 months out I can see if she had any problems.  Said if she had trouble with her right knee she like to see me I told her that she would need to take it up with the [facility] be glad to see this nice lady back at any time.  AP lateral sunrise view left knee x-ray taken the office today with comparison view shows well aligned total knee arthroplasty

## 2023-04-25 VITALS
SYSTOLIC BLOOD PRESSURE: 101 MMHG | RESPIRATION RATE: 17 BRPM | SYSTOLIC BLOOD PRESSURE: 162 MMHG | HEIGHT: 59 IN | OXYGEN SATURATION: 100 % | RESPIRATION RATE: 15 BRPM | TEMPERATURE: 97.7 F | DIASTOLIC BLOOD PRESSURE: 37 MMHG | HEART RATE: 79 BPM | SYSTOLIC BLOOD PRESSURE: 81 MMHG | DIASTOLIC BLOOD PRESSURE: 72 MMHG | OXYGEN SATURATION: 99 % | SYSTOLIC BLOOD PRESSURE: 104 MMHG | HEART RATE: 83 BPM | RESPIRATION RATE: 16 BRPM | SYSTOLIC BLOOD PRESSURE: 82 MMHG | DIASTOLIC BLOOD PRESSURE: 73 MMHG | SYSTOLIC BLOOD PRESSURE: 157 MMHG | RESPIRATION RATE: 20 BRPM | RESPIRATION RATE: 13 BRPM | TEMPERATURE: 97.5 F | SYSTOLIC BLOOD PRESSURE: 100 MMHG | WEIGHT: 120 LBS | OXYGEN SATURATION: 98 % | SYSTOLIC BLOOD PRESSURE: 91 MMHG | HEART RATE: 76 BPM | HEART RATE: 93 BPM | DIASTOLIC BLOOD PRESSURE: 38 MMHG | TEMPERATURE: 98 F | RESPIRATION RATE: 14 BRPM | DIASTOLIC BLOOD PRESSURE: 71 MMHG | DIASTOLIC BLOOD PRESSURE: 34 MMHG | HEART RATE: 77 BPM | DIASTOLIC BLOOD PRESSURE: 40 MMHG | DIASTOLIC BLOOD PRESSURE: 42 MMHG | HEART RATE: 94 BPM | DIASTOLIC BLOOD PRESSURE: 45 MMHG | SYSTOLIC BLOOD PRESSURE: 161 MMHG | HEART RATE: 92 BPM | RESPIRATION RATE: 19 BRPM

## 2023-04-26 PROBLEM — Z83.71 FAMILY HISTORY OF COLON POLYPS: Status: ACTIVE | Noted: 2023-04-27

## 2023-04-26 PROBLEM — Z86.010 PERSONAL HISTORY OF COLONIC POLYPS: Status: ACTIVE | Noted: 2023-04-27

## 2023-04-27 ENCOUNTER — AMBULATORY SURGICAL CENTER (AMBULATORY)
Dept: URBAN - METROPOLITAN AREA SURGERY 17 | Facility: SURGERY | Age: 63
End: 2023-04-27
Payer: OTHER GOVERNMENT

## 2023-04-27 DIAGNOSIS — Z86.010 PERSONAL HISTORY OF COLONIC POLYPS: ICD-10-CM

## 2023-04-27 DIAGNOSIS — Z83.71 FAMILY HISTORY OF COLONIC POLYPS: ICD-10-CM

## 2023-04-27 PROCEDURE — 45378 DIAGNOSTIC COLONOSCOPY: CPT | Mod: 33 | Performed by: INTERNAL MEDICINE

## (undated) DEVICE — TRAP FLD MINIVAC MEGADYNE 100ML

## (undated) DEVICE — SYR LUERLOK 30CC

## (undated) DEVICE — BNDG ELAS ELITE V/CLOSE 6IN 5YD LF STRL

## (undated) DEVICE — PREP SOL POVIDONE/IODINE BT 4OZ

## (undated) DEVICE — PK KN TOTL 40

## (undated) DEVICE — COVER,MAYO STAND,STERILE: Brand: MEDLINE

## (undated) DEVICE — SOL IRR NACL 0.9PCT ARTHROMATIC 3000ML

## (undated) DEVICE — 2108 SERIES SAGITTAL BLADE, NO OFFSET  (12.4 X 1.19 X 82.1MM)

## (undated) DEVICE — MAT FLR ABSORBENT LG 4FT 10 2.5FT

## (undated) DEVICE — APPL CHLORAPREP HI/LITE 26ML ORNG

## (undated) DEVICE — SYS CLS SKIN PREMIERPRO EXOFINFUSION 22CM

## (undated) DEVICE — DUAL CUT SAGITTAL BLADE

## (undated) DEVICE — SOL NACL 0.9PCT 100ML SGL

## (undated) DEVICE — TBG PENCL TELESCP MEGADYNE SMOKE EVAC 10FT

## (undated) DEVICE — INSTRUMENT BATTERY

## (undated) DEVICE — P.F.C. DRILL BIT AND STEINMAN PIN PACKET (1 UNIT) .125IN DIA 5IN LGTH
Type: IMPLANTABLE DEVICE | Site: KNEE | Status: NON-FUNCTIONAL
Brand: P.F.C.
Removed: 2023-01-10

## (undated) DEVICE — DECANTER BAG 9": Brand: MEDLINE INDUSTRIES, INC.

## (undated) DEVICE — UNDERCAST PADDING: Brand: DEROYAL

## (undated) DEVICE — OPTIFOAM GENTLE SA, POSTOP, 4X12: Brand: MEDLINE

## (undated) DEVICE — GLV SURG SENSICARE POLYISPRN W/ALOE PF LF 9 GRN STRL

## (undated) DEVICE — PREMIUM WET SKIN PREP TRAY: Brand: MEDLINE INDUSTRIES, INC.

## (undated) DEVICE — GLV SURG PREMIERPRO ORTHO LTX PF SZ8.5 BRN

## (undated) DEVICE — STERILE PATIENT PROTECTIVE PAD FOR IMP® KNEE POSITIONERS & COHESIVE WRAP (10 / CASE): Brand: DE MAYO KNEE POSITIONER®

## (undated) DEVICE — GLV SURG SENSICARE W/ALOE PF LF 9 STRL

## (undated) DEVICE — NEEDLE, QUINCKE, 18GX3.5": Brand: MEDLINE

## (undated) DEVICE — SUT VICRYL 1 CT1 27IN  JJ40G